# Patient Record
Sex: MALE | Race: BLACK OR AFRICAN AMERICAN | Employment: OTHER | URBAN - METROPOLITAN AREA
[De-identification: names, ages, dates, MRNs, and addresses within clinical notes are randomized per-mention and may not be internally consistent; named-entity substitution may affect disease eponyms.]

---

## 2017-01-26 ENCOUNTER — OFFICE VISIT (OUTPATIENT)
Dept: INTERNAL MEDICINE CLINIC | Age: 62
End: 2017-01-26

## 2017-01-26 VITALS
OXYGEN SATURATION: 93 % | HEIGHT: 71 IN | WEIGHT: 226.6 LBS | DIASTOLIC BLOOD PRESSURE: 85 MMHG | SYSTOLIC BLOOD PRESSURE: 130 MMHG | RESPIRATION RATE: 18 BRPM | BODY MASS INDEX: 31.72 KG/M2 | TEMPERATURE: 96.4 F | HEART RATE: 80 BPM

## 2017-01-26 DIAGNOSIS — J45.40 MODERATE PERSISTENT ASTHMA WITHOUT COMPLICATION: ICD-10-CM

## 2017-01-26 DIAGNOSIS — E29.1 MALE HYPOGONADISM: ICD-10-CM

## 2017-01-26 DIAGNOSIS — I10 ESSENTIAL HYPERTENSION: Primary | ICD-10-CM

## 2017-01-26 NOTE — PROGRESS NOTES
HISTORY OF PRESENT ILLNESS  Marina Parkinson Sr. is a 59 y.o. male. HPI  Here for htn. He is well controlled at home on his arb. He has well controlled asthma on dulera bid. He has had his immunizations last year. He sees urology for male hypogonadism and they monitor his psa. Past Medical History   Diagnosis Date    Alcohol dependence ([facility] [address]     Asthma      X 32, seasonal     Colon polyp     ED (erectile dysfunction)     GERD (gastroesophageal reflux disease)      after meals only, diet controlled no meds    Hypertension     IBS (irritable bowel syndrome)     Nephrolithiasis     Positive PPD     Right knee DJD     Seasonal allergic rhinitis     Ventral hernia 12/5/2012     Current Outpatient Prescriptions   Medication Sig    montelukast (SINGULAIR) 10 mg tablet TAKE 1 TABLET BY MOUTH DAILY    losartan-hydrochlorothiazide (HYZAAR) 50-12.5 mg per tablet TAKE 1 TABLET BY MOUTH EVERY DAY    fluticasone (FLONASE) 50 mcg/actuation nasal spray USE 2 SPRAYS IN EACH NOSTRIL DAILY    mometasone-formoterol (DULERA) 200-5 mcg/actuation HFA inhaler Take 2 Puffs by inhalation two (2) times a day.  testosterone (ANDROGEL) 20.25 mg/1.25 gram (1.62 %) gel Apply 20.25 mg to affected area daily. Max Daily Amount: 20.25 mg.    albuterol (VENTOLIN HFA) 90 mcg/actuation inhaler Take 2 puffs by inhalation every four (4) hours as needed for Wheezing.  multivitamin, stress formula (STRESS TAB) tablet Take 1 Tab by mouth daily. No current facility-administered medications for this visit. Review of Systems   All other systems reviewed and are negative. Visit Vitals    /85 (BP 1 Location: Left arm, BP Patient Position: Sitting)    Pulse 80    Temp 96.4 °F (35.8 °C) (Oral)    Resp 18    Ht 5' 11\" (1.803 m)    Wt 226 lb 9.6 oz (102.8 kg)    SpO2 93%    BMI 31.6 kg/m2       Physical Exam   Constitutional: He appears well-developed and well-nourished.    Cardiovascular: Normal rate, regular rhythm and normal heart sounds. Pulmonary/Chest: Effort normal and breath sounds normal. No respiratory distress. He has no wheezes. He has no rales. Nursing note and vitals reviewed. ASSESSMENT and Mary Tory was seen today for diabetes. Diagnoses and all orders for this visit:    Essential hypertension  -     METABOLIC PANEL, COMPREHENSIVE  -     LIPID PANEL  The current medical regimen is effective;  continue present plan and medications. Moderate persistent asthma without complication  -     CBC WITH AUTOMATED DIFF   The current medical regimen is effective;  continue present plan and medications. Male hypogonadism  The current medical regimen is effective;  continue present plan and medications.       Reviewed plan of care with the patient who has provided input and agrees with the goals

## 2017-01-26 NOTE — MR AVS SNAPSHOT
Visit Information Date & Time Provider Department Dept. Phone Encounter #  
 1/26/2017 11:30 AM Syeda Rueda MD Count includes the [facility] [phone] [id number] Upcoming Health Maintenance Date Due Hepatitis C Screening 1955 DTaP/Tdap/Td series (2 - Td) 11/11/2021 COLONOSCOPY 2/25/2024 Allergies as of 1/26/2017  Review Complete On: 1/26/2017 By: Chelle Kendall LPN Severity Noted Reaction Type Reactions Iodine  09/13/2010    Hives IVP contrast/seafood Current Immunizations  Reviewed on 10/18/2016 Name Date Influenza Vaccine 10/16/2016, 9/22/2015, 10/22/2014  2:15 PM, 10/14/2013 Influenza Vaccine Split 10/3/2012, 11/11/2011, 11/11/2010 Pneumococcal Conjugate (PCV-13) 10/26/2016 Pneumococcal Vaccine (Unspecified Type) 8/11/2010 TDAP Vaccine 11/11/2011, 8/11/2010 Zoster Vaccine, Live 11/11/2013 Not reviewed this visit You Were Diagnosed With   
  
 Codes Comments Essential hypertension    -  Primary ICD-10-CM: I10 
ICD-9-CM: 401.9 Moderate persistent asthma without complication     BIK-25-UI: J45.40 ICD-9-CM: 493.90 Male hypogonadism     ICD-10-CM: E29.1 ICD-9-CM: 257.2 Vitals BP Pulse Temp Resp Height(growth percentile) Weight(growth percentile) 130/85 (BP 1 Location: Left arm, BP Patient Position: Sitting) 80 96.4 °F (35.8 °C) (Oral) 18 5' 11\" (1.803 m) 226 lb 9.6 oz (102.8 kg) SpO2 BMI Smoking Status 93% 31.6 kg/m2 Former Smoker Vitals History BMI and BSA Data Body Mass Index Body Surface Area  
 31.6 kg/m 2 2.27 m 2 Preferred Pharmacy Pharmacy Name Phone [address] [place] [address], [facility] Juli Roberson [phone] Your Updated Medication List  
  
   
This list is accurate as of: 1/26/17 11:47 AM.  Always use your most recent med list.  
  
  
  
  
 albuterol 90 mcg/actuation inhaler Commonly known as:  VENTOLIN HFA Take 2 puffs by inhalation every four (4) hours as needed for Wheezing. DULERA 200-5 mcg/actuation HFA inhaler Generic drug:  mometasone-formoterol Take 2 Puffs by inhalation two (2) times a day. fluticasone 50 mcg/actuation nasal spray Commonly known as:  FLONASE  
USE 2 SPRAYS IN EACH NOSTRIL DAILY losartan-hydroCHLOROthiazide 50-12.5 mg per tablet Commonly known as:  HYZAAR  
TAKE 1 TABLET BY MOUTH EVERY DAY  
  
 montelukast 10 mg tablet Commonly known as:  SINGULAIR  
TAKE 1 TABLET BY MOUTH DAILY  
  
 multivitamin, stress formula tablet Commonly known as:  STRESS TAB Take 1 Tab by mouth daily. testosterone 1.62 % (20.25 mg/1.25gram) gel Commonly known as:  ANDROGEL Apply 20.25 mg to affected area daily. Max Daily Amount: 20.25 mg. We Performed the Following CBC WITH AUTOMATED DIFF [93874 CPT(R)] LIPID PANEL [78761 CPT(R)] METABOLIC PANEL, COMPREHENSIVE [14863 CPT(R)] Introducing [facility] & [facility] SERVICES! Dear [facility]: Thank you for requesting a [employer] account. Our records indicate that you already have an active [employer] account. You can access your account anytime at [email or web address] Did you know that you can access your hospital and ER discharge instructions at any time in [employer]? You can also review all of your test results from your hospital stay or ER visit. Additional Information If you have questions, please visit the Frequently Asked Questions section of the [employer] website at [email or web address] Remember, [employer] is NOT to be used for urgent needs. For medical emergencies, dial 911. Now available from your iPhone and Android! Please provide this summary of care documentation to your next provider. Your primary care clinician is listed as [phone] [address] [place] [address] [address].  If you have any questions after today's visit, please call [phone].

## 2017-01-27 LAB
ALBUMIN SERPL-MCNC: 3.9 G/DL (ref 3.6–4.8)
ALBUMIN/GLOB SERPL: 1 {RATIO} (ref 1.1–2.5)
ALP SERPL-CCNC: 65 IU/L (ref 39–117)
ALT SERPL-CCNC: 13 IU/L (ref 0–44)
AST SERPL-CCNC: 14 IU/L (ref 0–40)
BASOPHILS # BLD AUTO: 0 X10E3/UL (ref 0–0.2)
BASOPHILS NFR BLD AUTO: 0 %
BILIRUB SERPL-MCNC: 0.4 MG/DL (ref 0–1.2)
BUN SERPL-MCNC: 13 MG/DL (ref 8–27)
BUN/CREAT SERPL: 14 (ref 10–22)
CALCIUM SERPL-MCNC: 9.5 MG/DL (ref 8.6–10.2)
CHLORIDE SERPL-SCNC: 99 MMOL/L (ref 96–106)
CHOLEST SERPL-MCNC: 171 MG/DL (ref 100–199)
CO2 SERPL-SCNC: 25 MMOL/L (ref 18–29)
CREAT SERPL-MCNC: 0.92 MG/DL (ref 0.76–1.27)
EOSINOPHIL # BLD AUTO: 0.1 X10E3/UL (ref 0–0.4)
EOSINOPHIL NFR BLD AUTO: 1 %
ERYTHROCYTE [DISTWIDTH] IN BLOOD BY AUTOMATED COUNT: 14.8 % (ref 12.3–15.4)
GLOBULIN SER CALC-MCNC: 4 G/DL (ref 1.5–4.5)
GLUCOSE SERPL-MCNC: 85 MG/DL (ref 65–99)
HCT VFR BLD AUTO: 45.2 % (ref 37.5–51)
HDLC SERPL-MCNC: 43 MG/DL
HGB BLD-MCNC: 14.6 G/DL (ref 12.6–17.7)
IMM GRANULOCYTES # BLD: 0 X10E3/UL (ref 0–0.1)
IMM GRANULOCYTES NFR BLD: 0 %
INTERPRETATION, 910389: NORMAL
LDLC SERPL CALC-MCNC: 120 MG/DL (ref 0–99)
LYMPHOCYTES # BLD AUTO: 2 X10E3/UL (ref 0.7–3.1)
LYMPHOCYTES NFR BLD AUTO: 35 %
MCH RBC QN AUTO: 28.7 PG (ref 26.6–33)
MCHC RBC AUTO-ENTMCNC: 32.3 G/DL (ref 31.5–35.7)
MCV RBC AUTO: 89 FL (ref 79–97)
MONOCYTES # BLD AUTO: 0.5 X10E3/UL (ref 0.1–0.9)
MONOCYTES NFR BLD AUTO: 9 %
NEUTROPHILS # BLD AUTO: 3.2 X10E3/UL (ref 1.4–7)
NEUTROPHILS NFR BLD AUTO: 55 %
PLATELET # BLD AUTO: 287 X10E3/UL (ref 150–379)
POTASSIUM SERPL-SCNC: 4.1 MMOL/L (ref 3.5–5.2)
PROT SERPL-MCNC: 7.9 G/DL (ref 6–8.5)
RBC # BLD AUTO: 5.09 X10E6/UL (ref 4.14–5.8)
SODIUM SERPL-SCNC: 140 MMOL/L (ref 134–144)
TRIGL SERPL-MCNC: 42 MG/DL (ref 0–149)
VLDLC SERPL CALC-MCNC: 8 MG/DL (ref 5–40)
WBC # BLD AUTO: 5.8 X10E3/UL (ref 3.4–10.8)

## 2017-03-13 RX ORDER — LOSARTAN POTASSIUM AND HYDROCHLOROTHIAZIDE 12.5; 5 MG/1; MG/1
TABLET ORAL
Qty: 90 TAB | Refills: 1 | Status: SHIPPED | OUTPATIENT
Start: 2017-03-13 | End: 2017-11-22 | Stop reason: SDUPTHER

## 2017-05-18 ENCOUNTER — OFFICE VISIT (OUTPATIENT)
Dept: INTERNAL MEDICINE CLINIC | Age: 62
End: 2017-05-18

## 2017-05-18 VITALS
WEIGHT: 222.6 LBS | TEMPERATURE: 97.7 F | HEART RATE: 72 BPM | DIASTOLIC BLOOD PRESSURE: 85 MMHG | SYSTOLIC BLOOD PRESSURE: 140 MMHG | OXYGEN SATURATION: 96 % | RESPIRATION RATE: 18 BRPM | BODY MASS INDEX: 31.16 KG/M2 | HEIGHT: 71 IN

## 2017-05-18 DIAGNOSIS — Z00.00 WELL ADULT EXAM: Primary | ICD-10-CM

## 2017-05-18 DIAGNOSIS — J45.40 MODERATE PERSISTENT ASTHMA WITHOUT COMPLICATION: ICD-10-CM

## 2017-05-18 DIAGNOSIS — E29.1 MALE HYPOGONADISM: ICD-10-CM

## 2017-05-18 DIAGNOSIS — D12.2 ADENOMATOUS POLYP OF ASCENDING COLON: ICD-10-CM

## 2017-05-18 DIAGNOSIS — I10 ESSENTIAL HYPERTENSION: ICD-10-CM

## 2017-05-18 DIAGNOSIS — N52.9 ERECTILE DYSFUNCTION, UNSPECIFIED ERECTILE DYSFUNCTION TYPE: ICD-10-CM

## 2017-05-18 NOTE — PROGRESS NOTES
1. Have you been to the ER, urgent care clinic since your last visit? Hospitalized since your last visit? No    2. Have you seen or consulted any other health care providers outside of the [address] [place] [address] since your last visit? Include any pap smears or colon screening.  No   Chief Complaint   Patient presents with    Annual Wellness Visit     fasting    Waist is 43.5

## 2017-05-18 NOTE — MR AVS SNAPSHOT
Visit Information Date & Time Provider Department Dept. Phone Encounter #  
 5/18/2017  8:30 AM Jer Brown MD [facility] [phone] [id number] Upcoming Health Maintenance Date Due Hepatitis C Screening 1955 INFLUENZA AGE 9 TO ADULT 8/1/2017 DTaP/Tdap/Td series (2 - Td) 11/11/2021 COLONOSCOPY 2/25/2024 Allergies as of 5/18/2017  Review Complete On: 5/18/2017 By: Jez Pod Severity Noted Reaction Type Reactions Iodine  09/13/2010    Hives IVP contrast/seafood Current Immunizations  Reviewed on 10/18/2016 Name Date Influenza Vaccine 10/16/2016, 9/22/2015, 10/22/2014  2:15 PM, 10/14/2013 Influenza Vaccine Split 10/3/2012, 11/11/2011, 11/11/2010 Pneumococcal Conjugate (PCV-13) 10/26/2016 Pneumococcal Vaccine (Unspecified Type) 8/11/2010 TDAP Vaccine 11/11/2011, 8/11/2010 Zoster Vaccine, Live 11/11/2013 Not reviewed this visit You Were Diagnosed With   
  
 Codes Comments Well adult exam    -  Primary ICD-10-CM: Z00.00 ICD-9-CM: V70.0 Essential hypertension     ICD-10-CM: I10 
ICD-9-CM: 401.9 Moderate persistent asthma without complication     KYG-54-QI: J45.40 ICD-9-CM: 493.90 Male hypogonadism     ICD-10-CM: E29.1 ICD-9-CM: 257.2 Erectile dysfunction, unspecified erectile dysfunction type     ICD-10-CM: N52.9 ICD-9-CM: 607.84 Adenomatous polyp of ascending colon     ICD-10-CM: D12.2 ICD-9-CM: 211.3 Vitals BP Pulse Temp Resp Height(growth percentile) Weight(growth percentile) 140/85 (BP 1 Location: Left arm, BP Patient Position: At rest) 72 97.7 °F (36.5 °C) (Oral) 18 5' 11\" (1.803 m) 222 lb 9.6 oz (101 kg) SpO2 BMI Smoking Status 96% 31.05 kg/m2 Former Smoker Vitals History BMI and BSA Data Body Mass Index Body Surface Area 31.05 kg/m 2 2.25 m 2 Preferred Pharmacy Pharmacy Name Phone [address] [place] [address] Mary Roberson [phone] Your Updated Medication List  
  
   
This list is accurate as of: 5/18/17  8:47 AM.  Always use your most recent med list.  
  
  
  
  
 albuterol 90 mcg/actuation inhaler Commonly known as:  VENTOLIN HFA Take 2 puffs by inhalation every four (4) hours as needed for Wheezing. DULERA 200-5 mcg/actuation HFA inhaler Generic drug:  mometasone-formoterol Take 2 Puffs by inhalation two (2) times a day. fluticasone 50 mcg/actuation nasal spray Commonly known as:  FLONASE  
USE 2 SPRAYS IN EACH NOSTRIL DAILY losartan-hydroCHLOROthiazide 50-12.5 mg per tablet Commonly known as:  HYZAAR  
TAKE 1 TABLET BY MOUTH EVERY DAY  
  
 montelukast 10 mg tablet Commonly known as:  SINGULAIR  
TAKE 1 TABLET BY MOUTH DAILY  
  
 multivitamin, stress formula tablet Commonly known as:  STRESS TAB Take 1 Tab by mouth daily. testosterone 20.25 mg/1.25 gram (1.62 %) gel Commonly known as:  ANDROGEL Apply 20.25 mg to affected area daily. Max Daily Amount: 20.25 mg. Introducing [facility] & HEALTH SERVICES! Dear Arnaud Zimmerman: Thank you for requesting a [employer] account. Our records indicate that you already have an active [employer] account. You can access your account anytime at [email or web address] Did you know that you can access your hospital and ER discharge instructions at any time in [employer]? You can also review all of your test results from your hospital stay or ER visit. Additional Information If you have questions, please visit the Frequently Asked Questions section of the [employer] website at [email or web address] Remember, [employer] is NOT to be used for urgent needs. For medical emergencies, dial 911. Now available from your iPhone and Android! Please provide this summary of care documentation to your next provider. Your primary care clinician is listed as [phone] [address] [place] [address] [address]. If you have any questions after today's visit, please call [phone].

## 2017-05-18 NOTE — PROGRESS NOTES
HISTORY OF PRESENT ILLNESS  Jocelyn Bedoya Sr. is a 59 y.o. male. HPI  Here for a pe. He is in good health. His htn is controlled on an arb. His asthma is well controlled. He sees pulmonary. He has ed and male hypogonadism doing well on trt and his implant is functioning well. He golfs and is cycling for exercise now. His vaccines are current. He has colon polyps up to date with screening. Past Medical History:   Diagnosis Date    Alcohol dependence ([address]     Asthma     X 32, seasonal     Colon polyp     ED (erectile dysfunction)     GERD (gastroesophageal reflux disease)     after meals only, diet controlled no meds    Hypertension     IBS (irritable bowel syndrome)     Nephrolithiasis     Positive PPD     Right knee DJD     Seasonal allergic rhinitis     Ventral hernia 12/5/2012     Current Outpatient Prescriptions   Medication Sig    losartan-hydroCHLOROthiazide (HYZAAR) 50-12.5 mg per tablet TAKE 1 TABLET BY MOUTH EVERY DAY    montelukast (SINGULAIR) 10 mg tablet TAKE 1 TABLET BY MOUTH DAILY    fluticasone (FLONASE) 50 mcg/actuation nasal spray USE 2 SPRAYS IN EACH NOSTRIL DAILY    mometasone-formoterol (DULERA) 200-5 mcg/actuation HFA inhaler Take 2 Puffs by inhalation two (2) times a day.  testosterone (ANDROGEL) 20.25 mg/1.25 gram (1.62 %) gel Apply 20.25 mg to affected area daily. Max Daily Amount: 20.25 mg.    albuterol (VENTOLIN HFA) 90 mcg/actuation inhaler Take 2 puffs by inhalation every four (4) hours as needed for Wheezing.  multivitamin, stress formula (STRESS TAB) tablet Take 1 Tab by mouth daily. No current facility-administered medications for this visit. Review of Systems   All other systems reviewed and are negative.     Visit Vitals    /85 (BP 1 Location: Left arm, BP Patient Position: At rest)    Pulse 72    Temp 97.7 °F (36.5 °C) (Oral)    Resp 18    Ht 5' 11\" (1.803 m)    Wt 222 lb 9.6 oz (101 kg)    SpO2 96%    BMI 31.05 kg/m2       Physical Exam   Constitutional: He appears well-developed and well-nourished. Cardiovascular: Normal rate, regular rhythm and normal heart sounds. Pulmonary/Chest: Effort normal and breath sounds normal. No respiratory distress. He has no wheezes. He has no rales. Psychiatric: He has a normal mood and affect. His behavior is normal. Judgment and thought content normal.   Nursing note and vitals reviewed. ASSESSMENT and PLAN  Michelle Jacobson was seen today for annual wellness visit. Diagnoses and all orders for this visit:    Well adult exam    Essential hypertension   The current medical regimen is effective;  continue present plan and medications. Moderate persistent asthma without complication  The current medical regimen is effective;  continue present plan and medications. Male hypogonadism  The current medical regimen is effective;  continue present plan and medications. Erectile dysfunction, unspecified erectile dysfunction type  The current medical regimen is effective;  continue present plan and medications. Adenomatous polyp of ascending colon  Up to date.     Reviewed plan of care with the patient who has provided input and agrees with the goals

## 2017-08-02 RX ORDER — MONTELUKAST SODIUM 10 MG/1
TABLET ORAL
Qty: 90 TAB | Refills: 1 | Status: SHIPPED | OUTPATIENT
Start: 2017-08-02 | End: 2018-05-18 | Stop reason: SDUPTHER

## 2017-08-02 RX ORDER — FLUTICASONE PROPIONATE 50 MCG
SPRAY, SUSPENSION (ML) NASAL
Qty: 3 BOTTLE | Refills: 1 | Status: SHIPPED | OUTPATIENT
Start: 2017-08-02 | End: 2018-02-19 | Stop reason: SDUPTHER

## 2017-11-27 RX ORDER — LOSARTAN POTASSIUM AND HYDROCHLOROTHIAZIDE 12.5; 5 MG/1; MG/1
TABLET ORAL
Qty: 90 TAB | Refills: 1 | Status: SHIPPED | OUTPATIENT
Start: 2017-11-27 | End: 2018-05-18 | Stop reason: SDUPTHER

## 2017-12-15 ENCOUNTER — OFFICE VISIT (OUTPATIENT)
Dept: INTERNAL MEDICINE CLINIC | Age: 62
End: 2017-12-15

## 2017-12-15 VITALS
HEART RATE: 78 BPM | RESPIRATION RATE: 16 BRPM | HEIGHT: 71 IN | OXYGEN SATURATION: 97 % | SYSTOLIC BLOOD PRESSURE: 120 MMHG | TEMPERATURE: 97.8 F | BODY MASS INDEX: 32.42 KG/M2 | WEIGHT: 231.6 LBS | DIASTOLIC BLOOD PRESSURE: 82 MMHG

## 2017-12-15 DIAGNOSIS — J45.40 MODERATE PERSISTENT ASTHMA WITHOUT COMPLICATION: Primary | ICD-10-CM

## 2017-12-15 DIAGNOSIS — F43.9 SITUATIONAL STRESS: ICD-10-CM

## 2017-12-15 DIAGNOSIS — K21.9 GASTROESOPHAGEAL REFLUX DISEASE WITHOUT ESOPHAGITIS: ICD-10-CM

## 2017-12-15 DIAGNOSIS — E29.1 MALE HYPOGONADISM: ICD-10-CM

## 2017-12-15 DIAGNOSIS — I10 ESSENTIAL HYPERTENSION: ICD-10-CM

## 2017-12-15 RX ORDER — DICLOFENAC SODIUM 50 MG/1
50 TABLET, DELAYED RELEASE ORAL
COMMUNITY
Start: 2017-07-05 | End: 2022-05-19 | Stop reason: ALTCHOICE

## 2017-12-15 NOTE — PROGRESS NOTES
1. Have you been to the ER, urgent care clinic since your last visit? Yes [facility]  Hospitalized since your last visit?no      2. Have you seen or consulted any other health care providers outside of the [address] [place] [address] since your last visit? Include any pap smears or colon screening.  No    Chief Complaint   Patient presents with    Hypertension     follow up     Not fasting

## 2017-12-15 NOTE — MR AVS SNAPSHOT
Visit Information Date & Time Provider Department Dept. Phone Encounter #  
 12/15/2017 11:15 AM Holly Felder MD [facility] [phone] [id number] Upcoming Health Maintenance Date Due Hepatitis C Screening 1955 DTaP/Tdap/Td series (2 - Td) 11/11/2021 COLONOSCOPY 2/25/2024 Allergies as of 12/15/2017  Review Complete On: 5/18/2017 By: Igor Huang Severity Noted Reaction Type Reactions Iodine  09/13/2010    Hives IVP contrast/seafood Current Immunizations  Reviewed on 10/18/2016 Name Date Influenza Vaccine 10/18/2017, 10/16/2016, 9/22/2015, 10/22/2014  2:15 PM, 10/14/2013 Influenza Vaccine Split 10/3/2012, 11/11/2011, 11/11/2010 Pneumococcal Conjugate (PCV-13) 10/26/2016 TDAP Vaccine 11/11/2011, 8/11/2010 ZZZ-RETIRED (DO NOT USE) Pneumococcal Vaccine (Unspecified Type) 8/11/2010 Zoster Vaccine, Live 11/11/2013 Not reviewed this visit You Were Diagnosed With   
  
 Codes Comments Moderate persistent asthma without complication    -  Primary ICD-10-CM: J45.40 ICD-9-CM: 493.90 Essential hypertension     ICD-10-CM: I10 
ICD-9-CM: 401.9 Male hypogonadism     ICD-10-CM: E29.1 ICD-9-CM: 257.2 Vitals BP Pulse Temp Resp Height(growth percentile) Weight(growth percentile) (!) 144/94 (BP 1 Location: Left arm, BP Patient Position: At rest) 78 97.8 °F (36.6 °C) (Oral) 16 5' 11\" (1.803 m) 231 lb 9.6 oz (105.1 kg) SpO2 BMI Smoking Status 97% 32.3 kg/m2 Former Smoker BMI and BSA Data Body Mass Index Body Surface Area  
 32.3 kg/m 2 2.29 m 2 Preferred Pharmacy Pharmacy Name Phone [address] [place] [address], [facility] Mary Roberson [phone] Your Updated Medication List  
  
   
This list is accurate as of: 12/15/17 11:27 AM.  Always use your most recent med list.  
  
  
  
  
 albuterol 90 mcg/actuation inhaler Commonly known as:  VENTOLIN HFA Take 2 puffs by inhalation every four (4) hours as needed for Wheezing. diclofenac EC 50 mg EC tablet Commonly known as:  VOLTAREN Take 50 mg by mouth. DULERA 200-5 mcg/actuation HFA inhaler Generic drug:  mometasone-formoterol Take 2 Puffs by inhalation two (2) times a day. fluticasone 50 mcg/actuation nasal spray Commonly known as:  FLONASE  
USE 2 SPRAYS IN EACH NOSTRIL DAILY losartan-hydroCHLOROthiazide 50-12.5 mg per tablet Commonly known as:  HYZAAR  
TAKE 1 TABLET BY MOUTH EVERY DAY  
  
 montelukast 10 mg tablet Commonly known as:  SINGULAIR  
TAKE 1 TABLET BY MOUTH DAILY  
  
 multivitamin, stress formula tablet Commonly known as:  STRESS TAB Take 1 Tab by mouth daily. testosterone 20.25 mg/1.25 gram (1.62 %) gel Commonly known as:  ANDROGEL Apply 20.25 mg to affected area daily. Max Daily Amount: 20.25 mg. Introducing [facility] & HEALTH SERVICES! Dear Jhonatan Blanton: Thank you for requesting a [employer] account. Our records indicate that you already have an active [employer] account. You can access your account anytime at [email or web address] Did you know that you can access your hospital and ER discharge instructions at any time in [employer]? You can also review all of your test results from your hospital stay or ER visit. Additional Information If you have questions, please visit the Frequently Asked Questions section of the [employer] website at [email or web address] Remember, [employer] is NOT to be used for urgent needs. For medical emergencies, dial 911. Now available from your iPhone and Android! Please provide this summary of care documentation to your next provider. Your primary care clinician is listed as [phone] [address] [place] [address] [address]. If you have any questions after today's visit, please call [phone].

## 2018-02-20 RX ORDER — FLUTICASONE PROPIONATE 50 MCG
SPRAY, SUSPENSION (ML) NASAL
Qty: 3 BOTTLE | Refills: 1 | Status: SHIPPED | OUTPATIENT
Start: 2018-02-20 | End: 2018-05-18 | Stop reason: SDUPTHER

## 2018-02-20 NOTE — TELEPHONE ENCOUNTER
Last seen: 12/15/2017    Requested Prescriptions     Pending Prescriptions Disp Refills    fluticasone (FLONASE) 50 mcg/actuation nasal spray 3 Bottle 1

## 2018-05-18 ENCOUNTER — OFFICE VISIT (OUTPATIENT)
Dept: INTERNAL MEDICINE CLINIC | Age: 63
End: 2018-05-18

## 2018-05-18 VITALS
HEART RATE: 65 BPM | TEMPERATURE: 98.1 F | OXYGEN SATURATION: 95 % | WEIGHT: 231 LBS | HEIGHT: 70 IN | SYSTOLIC BLOOD PRESSURE: 164 MMHG | BODY MASS INDEX: 33.07 KG/M2 | DIASTOLIC BLOOD PRESSURE: 77 MMHG

## 2018-05-18 DIAGNOSIS — I10 ESSENTIAL HYPERTENSION: Primary | ICD-10-CM

## 2018-05-18 DIAGNOSIS — Z11.59 ENCOUNTER FOR HEPATITIS C SCREENING TEST FOR LOW RISK PATIENT: ICD-10-CM

## 2018-05-18 DIAGNOSIS — J45.20 MILD INTERMITTENT ASTHMA, UNSPECIFIED WHETHER COMPLICATED: ICD-10-CM

## 2018-05-18 DIAGNOSIS — E66.9 OBESITY WITH SERIOUS COMORBIDITY, UNSPECIFIED CLASSIFICATION, UNSPECIFIED OBESITY TYPE: ICD-10-CM

## 2018-05-18 RX ORDER — CLOMIPHENE CITRATE 50 MG/1
50 TABLET ORAL DAILY
COMMUNITY
End: 2022-05-19

## 2018-05-18 RX ORDER — MONTELUKAST SODIUM 10 MG/1
TABLET ORAL
Qty: 90 TAB | Refills: 3 | Status: SHIPPED | OUTPATIENT
Start: 2018-05-18 | End: 2019-07-05 | Stop reason: SDUPTHER

## 2018-05-18 RX ORDER — LOSARTAN POTASSIUM AND HYDROCHLOROTHIAZIDE 12.5; 5 MG/1; MG/1
TABLET ORAL
Qty: 90 TAB | Refills: 3 | Status: SHIPPED | OUTPATIENT
Start: 2018-05-18 | End: 2019-04-24

## 2018-05-18 RX ORDER — FLUTICASONE PROPIONATE 50 MCG
SPRAY, SUSPENSION (ML) NASAL
Qty: 3 BOTTLE | Refills: 3 | Status: SHIPPED | OUTPATIENT
Start: 2018-05-18 | End: 2019-07-05 | Stop reason: SDUPTHER

## 2018-05-18 RX ORDER — FOLIC ACID 1 MG/1
TABLET ORAL DAILY
COMMUNITY
End: 2022-05-18 | Stop reason: ALTCHOICE

## 2018-05-18 NOTE — MR AVS SNAPSHOT
[address] [place] [phone] Patient: Marcy Daniel Sr. MRN:  :[id number] Visit Information Date & Time Provider Department Dept. Phone Encounter #  
 2018 11:00 AM Rosie Gross, [address] [place] [address],[address] [phone] [id number] Follow-up Instructions Return in about 3 months (around 2018) for htn. Upcoming Health Maintenance Date Due Hepatitis C Screening 1955 Influenza Age 5 to Adult 2018 DTaP/Tdap/Td series (2 - Td) 2021 COLONOSCOPY 2024 Allergies as of 2018  Review Complete On: 2018 By: [place] LPN Severity Noted Reaction Type Reactions Iodine  2010    Hives IVP contrast/seafood Current Immunizations  Reviewed on 10/18/2016 Name Date Influenza Vaccine 10/18/2017, 10/16/2016, 2015, 10/22/2014  2:15 PM, 10/14/2013 Influenza Vaccine Split 10/3/2012, 2011, 2010 Pneumococcal Conjugate (PCV-13) 10/26/2016 TDAP Vaccine 2011, 2010 ZZZ-RETIRED (DO NOT USE) Pneumococcal Vaccine (Unspecified Type) 2010 Zoster Vaccine, Live 2013 Not reviewed this visit You Were Diagnosed With   
  
 Codes Comments Essential hypertension    -  Primary ICD-10-CM: I10 
ICD-9-CM: 401.9 Mild intermittent asthma, unspecified whether complicated     YDY-15-BP: J45.20 ICD-9-CM: 493.90 Obesity with serious comorbidity, unspecified classification, unspecified obesity type     ICD-10-CM: E66.9 ICD-9-CM: 278.00 Encounter for hepatitis C screening test for low risk patient     ICD-10-CM: Z11.59 
ICD-9-CM: V73.89 Vitals BP Pulse Temp Height(growth percentile) Weight(growth percentile) SpO2  
 164/77 (BP 1 Location: Left arm, BP Patient Position: Sitting) 65 98.1 °F (36.7 °C) (Oral) 5' 10\" (1.778 m) 231 lb (104.8 kg) 95% BMI Smoking Status 33.15 kg/m2 Former Smoker BMI and BSA Data Body Mass Index Body Surface Area  
 33.15 kg/m 2 2.28 m 2 Preferred Pharmacy Pharmacy Name Phone [address] Mary Roberson [phone] Your Updated Medication List  
  
   
This list is accurate as of 5/18/18 11:52 AM.  Always use your most recent med list.  
  
  
  
  
 albuterol 90 mcg/actuation inhaler Commonly known as:  VENTOLIN HFA Take 2 puffs by inhalation every four (4) hours as needed for Wheezing. clomiPHENE 50 mg tablet Commonly known as:  CLOMID Take 50 mg by mouth daily. diclofenac EC 50 mg EC tablet Commonly known as:  VOLTAREN Take 50 mg by mouth. DULERA 200-5 mcg/actuation HFA inhaler Generic drug:  mometasone-formoterol Take 2 Puffs by inhalation two (2) times a day. fluticasone 50 mcg/actuation nasal spray Commonly known as:  FLONASE  
USE 2 SPRAYS IN EACH NOSTRIL DAILY  
  
 folic acid 1 mg tablet Commonly known as:  Google Take  by mouth daily. losartan-hydroCHLOROthiazide 50-12.5 mg per tablet Commonly known as:  HYZAAR  
TAKE 1 TABLET BY MOUTH EVERY DAY  
  
 montelukast 10 mg tablet Commonly known as:  SINGULAIR  
TAKE 1 TABLET BY MOUTH DAILY  
  
 multivitamin, stress formula tablet Commonly known as:  STRESS TAB Take 1 Tab by mouth daily. PROBIOTIC 4X 10-15 mg Tbec Generic drug:  B.infantis-B.ani-B.long-B.bifi Take  by mouth. testosterone 20.25 mg/1.25 gram (1.62 %) gel Commonly known as:  ANDROGEL Apply 20.25 mg to affected area daily. Max Daily Amount: 20.25 mg.  
  
 TURMERIC (BULK)  
by Does Not Apply route. Prescriptions Sent to Pharmacy Refills  
 fluticasone (FLONASE) 50 mcg/actuation nasal spray 3 Sig: USE 2 SPRAYS IN EACH NOSTRIL DAILY  Class: Normal  
 Pharmacy: [address] [place] [address] Dominique, [address]  #: [phone]  
 losartan-hydroCHLOROthiazide [facility]) 50-12.5 mg per tablet 3 Sig: TAKE 1 TABLET BY MOUTH EVERY DAY Class: Normal  
 Pharmacy: [address] [place], [place] [address] Ph #: [phone]  
 montelukast (SINGULAIR) 10 mg tablet 3 Sig: TAKE 1 TABLET BY MOUTH DAILY Class: Normal  
 Pharmacy: [address] [place] [address] Radha Foxraat  Ph #: [phone] We Performed the Following CBC W/O DIFF [15668 CPT(R)] HEPATITIS C AB [02533 CPT(R)] LIPID PANEL [65819 CPT(R)] METABOLIC PANEL, COMPREHENSIVE [75962 CPT(R)] Follow-up Instructions Return in about 3 months (around 8/18/2018) for htn. Introducing [facility] & [facility] SERVICES! Dear Dexter Chang: Thank you for requesting a [employer] account. Our records indicate that you already have an active [employer] account. You can access your account anytime at [email or web address] Did you know that you can access your hospital and ER discharge instructions at any time in [employer] You can also review all of your test results from your hospital stay or ER visit. Additional Information If you have questions, please visit the Frequently Asked Questions section of the [employer] website at [email or web address] Remember, [employer] is NOT to be used for urgent needs. For medical emergencies, dial 911. Now available from your iPhone and Android! Please provide this summary of care documentation to your next provider. Your primary care clinician is listed as John SHANE. If you have any questions after today's visit, please call [phone].

## 2018-05-18 NOTE — PROGRESS NOTES
HISTORY OF PRESENT ILLNESS  Kike Rodriguez is a 57 y.o. male. HPI   New pt to establish care ( former PCP Dr Sarika Limon)  Hx HTN , Asthma, knee OA  Sees Dr Eden Arndt for ED, ( penile implant) and hypogonadism-clomiphene  Sees Dr Camden Rojas for Asthma   1 son  [employer]    Had prostate bx 2012-benign path  Last OV ( Dr Sarika Limon)  Here for HTN. He is well controlled on an ARB. He is doing well with asthma on his inhaler. He is seeing urology for PSA every six months . He had GERD last night after eating before bedtime . He is worried about his wife who may have sarcoid. Patient Active Problem List    Diagnosis Date Noted    Situational stress 12/15/2017    Essential hypertension 01/26/2017    Moderate persistent asthma without complication 54/95/1073    Sepsis ([facility] [address] 04/29/2016    Pneumonia 04/29/2016    Right knee DJD     Nephrolithiasis     Colon polyp     Ventral hernia 12/05/2012    Seasonal allergic rhinitis 10/03/2012    Male hypogonadism 11/11/2011    ED (erectile dysfunction)      Current Outpatient Prescriptions   Medication Sig Dispense Refill    TURMERIC, BULK, by Does Not Apply route.  B.infantis-B.ani-B.long-B.bifi (PROBIOTIC 4X) 10-15 mg TbEC Take  by mouth.  clomiPHENE (CLOMID) 50 mg tablet Take 50 mg by mouth daily.  folic acid (FOLVITE) 1 mg tablet Take  by mouth daily.  fluticasone (FLONASE) 50 mcg/actuation nasal spray USE 2 SPRAYS IN EACH NOSTRIL DAILY 3 Bottle 1    diclofenac EC (VOLTAREN) 50 mg EC tablet Take 50 mg by mouth.  losartan-hydroCHLOROthiazide (HYZAAR) 50-12.5 mg per tablet TAKE 1 TABLET BY MOUTH EVERY DAY 90 Tab 1    montelukast (SINGULAIR) 10 mg tablet TAKE 1 TABLET BY MOUTH DAILY 90 Tab 1    mometasone-formoterol (DULERA) 200-5 mcg/actuation HFA inhaler Take 2 Puffs by inhalation two (2) times a day.  multivitamin, stress formula (STRESS TAB) tablet Take 1 Tab by mouth daily.       testosterone (ANDROGEL) 20.25 mg/1.25 gram (1.62 %) gel Apply 20.25 mg to affected area daily. Max Daily Amount: 20.25 mg. 3 Bottle 1    albuterol (VENTOLIN HFA) 90 mcg/actuation inhaler Take 2 puffs by inhalation every four (4) hours as needed for Wheezing.  1 Inhaler 5     Allergies   Allergen Reactions    Iodine Hives     IVP contrast/seafood     Past Medical History:   Diagnosis Date    Alcohol dependence ([facility] [address]     Asthma     X 32, seasonal     Colon polyp     ED (erectile dysfunction)     GERD (gastroesophageal reflux disease)     after meals only, diet controlled no meds    Hypertension     IBS (irritable bowel syndrome)     Nephrolithiasis     Positive PPD     Right knee DJD     Seasonal allergic rhinitis     Ventral hernia 12/5/2012     Past Surgical History:   Procedure Laterality Date    HX COLONOSCOPY  2014    due 2019    HX PROSTATECTOMY  12/12    Biopsy    HX VASECTOMY      PROSTHESIS, PENILE, INFLATAB       Family History   Problem Relation Age of Onset    Alcohol abuse Father     Diabetes Father     Cancer Father      larynx    Hypertension Mother     Alcohol abuse Paternal Grandmother     Diabetes Paternal Grandmother      Social History   Substance Use Topics    Smoking status: Former Smoker     Packs/day: 1.00     Years: 12.00     Quit date: 9/1/1981    Smokeless tobacco: Never Used    Alcohol use Yes      Comment: 6 glasses per month      Lab Results  Component Value Date/Time   Hemoglobin A1c 5.8 (H) 11/11/2011 08:54 AM   Glucose 85 01/26/2017 02:14 PM   Microalb/Creat ratio (ug/mg creat.) 11.4 08/26/2014 09:25 AM   LDL, calculated 120 (H) 01/26/2017 02:14 PM   Creatinine 0.92 01/26/2017 02:14 PM      Lab Results  Component Value Date/Time   Cholesterol, total 171 01/26/2017 02:14 PM   HDL Cholesterol 43 01/26/2017 02:14 PM   LDL, calculated 120 (H) 01/26/2017 02:14 PM   Triglyceride 42 01/26/2017 02:14 PM   CHOL/HDL Ratio 4.2 08/14/2010 06:46 AM     Lab Results  Component Value Date/Time   GFR est non-AA 88 01/26/2017 02:14 PM   GFR est  01/26/2017 02:14 PM   Creatinine 0.92 01/26/2017 02:14 PM   BUN 13 01/26/2017 02:14 PM   Sodium 140 01/26/2017 02:14 PM   Potassium 4.1 01/26/2017 02:14 PM   Chloride 99 01/26/2017 02:14 PM   CO2 25 01/26/2017 02:14 PM        ROS    Physical Exam   Constitutional: He appears well-developed and well-nourished. No distress. Appears stated age, obese   HENT:   Head: Normocephalic. Cardiovascular: Normal rate, regular rhythm and normal heart sounds. Exam reveals no gallop and no friction rub. No murmur heard. Pulmonary/Chest: Effort normal. He has no wheezes. He has no rales. He exhibits no tenderness. Abdominal: Soft. He exhibits no distension and no mass. There is no tenderness. There is no rebound and no guarding. Rectus diastasis   Musculoskeletal: He exhibits no edema. Warm right knee , small effusion   Neurological: He is alert. Psychiatric: He has a normal mood and affect. Nursing note and vitals reviewed. ASSESSMENT and PLAN  Diagnoses and all orders for this visit:    1. Essential hypertension  -     METABOLIC PANEL, COMPREHENSIVE  -     LIPID PANEL  -     CBC W/O DIFF   Mild -mod SBP elevation, advised low sodium diet, bp monitoring with home cuff  2. Mild intermittent asthma, unspecified whether complicated   Well controlled on dulera  3. Obesity with serious comorbidity, unspecified classification, unspecified obesity type   I have reviewed/discussed the above normal BMI with the patient. I have recommended the following interventions: dietary management education, guidance, and counseling . Alyssa Ramos     4. Encounter for hepatitis C screening test for low risk patient  -     HEPATITIS C AB    Other orders  -     fluticasone (FLONASE) 50 mcg/actuation nasal spray; USE 2 SPRAYS IN EACH NOSTRIL DAILY  -     losartan-hydroCHLOROthiazide (HYZAAR) 50-12.5 mg per tablet; TAKE 1 TABLET BY MOUTH EVERY DAY  -     montelukast (SINGULAIR) 10 mg tablet; TAKE 1 TABLET BY MOUTH DAILY      Follow-up Disposition:  Return in about 3 months (around 8/18/2018) for htn.

## 2018-07-31 ENCOUNTER — TELEPHONE (OUTPATIENT)
Dept: INTERNAL MEDICINE CLINIC | Age: 63
End: 2018-07-31

## 2018-07-31 NOTE — TELEPHONE ENCOUNTER
Pt is requesting to have a new order for labs written, because he misplaced the previous one given to him.  Best contact number: [phone]       Message received & copied from [facility]

## 2018-07-31 NOTE — TELEPHONE ENCOUNTER
Spoke with patient after 2 patient identifiers being note and advised that he could come In to the office and get his labs drawn and not get replacement slips, he reported he would be here at 8am sharp in the morning. Patient expressed understanding and has no further questions at this time.

## 2018-08-01 ENCOUNTER — APPOINTMENT (OUTPATIENT)
Dept: INTERNAL MEDICINE CLINIC | Age: 63
End: 2018-08-01

## 2018-08-02 LAB
ALBUMIN SERPL-MCNC: 4.2 G/DL (ref 3.6–4.8)
ALBUMIN/GLOB SERPL: 1.2 {RATIO} (ref 1.2–2.2)
ALP SERPL-CCNC: 64 IU/L (ref 39–117)
ALT SERPL-CCNC: 23 IU/L (ref 0–44)
AST SERPL-CCNC: 20 IU/L (ref 0–40)
BILIRUB SERPL-MCNC: 0.3 MG/DL (ref 0–1.2)
BUN SERPL-MCNC: 15 MG/DL (ref 8–27)
BUN/CREAT SERPL: 16 (ref 10–24)
CALCIUM SERPL-MCNC: 9.2 MG/DL (ref 8.6–10.2)
CHLORIDE SERPL-SCNC: 102 MMOL/L (ref 96–106)
CHOLEST SERPL-MCNC: 211 MG/DL (ref 100–199)
CO2 SERPL-SCNC: 26 MMOL/L (ref 20–29)
CREAT SERPL-MCNC: 0.94 MG/DL (ref 0.76–1.27)
ERYTHROCYTE [DISTWIDTH] IN BLOOD BY AUTOMATED COUNT: 15.2 % (ref 12.3–15.4)
GLOBULIN SER CALC-MCNC: 3.5 G/DL (ref 1.5–4.5)
GLUCOSE SERPL-MCNC: 94 MG/DL (ref 65–99)
HCT VFR BLD AUTO: 42.2 % (ref 37.5–51)
HDLC SERPL-MCNC: 43 MG/DL
HGB BLD-MCNC: 14.3 G/DL (ref 13–17.7)
LDLC SERPL CALC-MCNC: 158 MG/DL (ref 0–99)
MCH RBC QN AUTO: 30.2 PG (ref 26.6–33)
MCHC RBC AUTO-ENTMCNC: 33.9 G/DL (ref 31.5–35.7)
MCV RBC AUTO: 89 FL (ref 79–97)
PLATELET # BLD AUTO: 265 X10E3/UL (ref 150–379)
POTASSIUM SERPL-SCNC: 4.4 MMOL/L (ref 3.5–5.2)
PROT SERPL-MCNC: 7.7 G/DL (ref 6–8.5)
RBC # BLD AUTO: 4.74 X10E6/UL (ref 4.14–5.8)
SODIUM SERPL-SCNC: 141 MMOL/L (ref 134–144)
TRIGL SERPL-MCNC: 48 MG/DL (ref 0–149)
VLDLC SERPL CALC-MCNC: 10 MG/DL (ref 5–40)
WBC # BLD AUTO: 4.4 X10E3/UL (ref 3.4–10.8)

## 2018-08-03 LAB — HCV AB S/CO SERPL IA: 0.2 S/CO RATIO (ref 0–0.9)

## 2018-08-05 NOTE — PROGRESS NOTES
Tell pt screening la for hep c is negative, normal blood cell counts, glucose,kidney liver lytes. Cholesterol levels are mildly elevated now--lower 1 year ago. Work on low fat diet and exericse.  Can repeat at next visit

## 2018-08-16 ENCOUNTER — OFFICE VISIT (OUTPATIENT)
Dept: INTERNAL MEDICINE CLINIC | Age: 63
End: 2018-08-16

## 2018-08-16 VITALS
WEIGHT: 224 LBS | HEART RATE: 79 BPM | HEIGHT: 70 IN | TEMPERATURE: 97.7 F | DIASTOLIC BLOOD PRESSURE: 72 MMHG | BODY MASS INDEX: 32.07 KG/M2 | SYSTOLIC BLOOD PRESSURE: 126 MMHG | OXYGEN SATURATION: 95 %

## 2018-08-16 DIAGNOSIS — E78.00 PURE HYPERCHOLESTEROLEMIA: ICD-10-CM

## 2018-08-16 DIAGNOSIS — I10 ESSENTIAL HYPERTENSION: Primary | ICD-10-CM

## 2018-08-16 NOTE — PROGRESS NOTES
HISTORY OF PRESENT ILLNESS  Kike Rodriguez is a 57 y.o. male. HPI     F/u HTN  bp was elevated last visit  Chol elevated  this month  Lost 7 lbs with diet and exercise  exerciese on ex bike and some weigth lifting a few days per week  Feels well       Patient Active Problem List    Diagnosis Date Noted    Situational stress 12/15/2017    Essential hypertension 01/26/2017    Moderate persistent asthma without complication 21/67/3710    Sepsis ([address] 04/29/2016    Pneumonia 04/29/2016    Right knee DJD     Nephrolithiasis     Colon polyp     Ventral hernia 12/05/2012    Seasonal allergic rhinitis 10/03/2012    Male hypogonadism 11/11/2011    ED (erectile dysfunction)      Current Outpatient Prescriptions   Medication Sig Dispense Refill    TURMERIC, BULK, by Does Not Apply route.  B.infantis-B.ani-B.long-B.bifi (PROBIOTIC 4X) 10-15 mg TbEC Take  by mouth.  folic acid (FOLVITE) 1 mg tablet Take  by mouth daily.  fluticasone (FLONASE) 50 mcg/actuation nasal spray USE 2 SPRAYS IN EACH NOSTRIL DAILY 3 Bottle 3    losartan-hydroCHLOROthiazide (HYZAAR) 50-12.5 mg per tablet TAKE 1 TABLET BY MOUTH EVERY DAY 90 Tab 3    montelukast (SINGULAIR) 10 mg tablet TAKE 1 TABLET BY MOUTH DAILY 90 Tab 3    mometasone-formoterol (DULERA) 200-5 mcg/actuation HFA inhaler Take 2 Puffs by inhalation two (2) times a day.  testosterone (ANDROGEL) 20.25 mg/1.25 gram (1.62 %) gel Apply 20.25 mg to affected area daily. Max Daily Amount: 20.25 mg. 3 Bottle 1    multivitamin, stress formula (STRESS TAB) tablet Take 1 Tab by mouth daily.  clomiPHENE (CLOMID) 50 mg tablet Take 50 mg by mouth daily.  diclofenac EC (VOLTAREN) 50 mg EC tablet Take 50 mg by mouth.  albuterol (VENTOLIN HFA) 90 mcg/actuation inhaler Take 2 puffs by inhalation every four (4) hours as needed for Wheezing.  1 Inhaler 5     Allergies   Allergen Reactions    Iodine Hives     IVP contrast/seafood      Lab Results  Component Value Date/Time   Cholesterol, total 211 (H) 08/01/2018 08:05 AM   HDL Cholesterol 43 08/01/2018 08:05 AM   LDL, calculated 158 (H) 08/01/2018 08:05 AM   Triglyceride 48 08/01/2018 08:05 AM   CHOL/HDL Ratio 4.2 08/14/2010 06:46 AM     Lab Results  Component Value Date/Time   GFR est non-AA 85 08/01/2018 08:05 AM   GFR est AA 99 08/01/2018 08:05 AM   Creatinine 0.94 08/01/2018 08:05 AM   BUN 15 08/01/2018 08:05 AM   Sodium 141 08/01/2018 08:05 AM   Potassium 4.4 08/01/2018 08:05 AM   Chloride 102 08/01/2018 08:05 AM   CO2 26 08/01/2018 08:05 AM        ROS    Physical Exam   Constitutional: He appears well-developed and well-nourished. No distress. Appears stated age   HENT:   Head: Normocephalic. Cardiovascular: Normal rate, regular rhythm and normal heart sounds. Exam reveals no gallop and no friction rub. No murmur heard. Pulmonary/Chest: Effort normal and breath sounds normal.   Abdominal: Soft. Musculoskeletal: He exhibits no edema. Neurological: He is alert. Psychiatric: He has a normal mood and affect. Nursing note and vitals reviewed. ASSESSMENT and PLAN  Diagnoses and all orders for this visit:    1. Essential hypertension   Controlled , continue hyzaar  2. Pure hypercholesterolemia   Continue diet and exercise    Follow-up Disposition:  Return in about 6 months (around 2/16/2019) for htn hld-labs.

## 2018-08-16 NOTE — MR AVS SNAPSHOT
[address] [facility] 
[phone] Patient: Sonia Lee Sr. MRN:  :[id number] Visit Information Date & Time Provider Department Dept. Phone Encounter #  
 2018  4:00 PM Chris Longo, [address] [place] [address],[address] [phone] [id number] Follow-up Instructions Return in about 6 months (around 2019) for htn hld-labs. Upcoming Health Maintenance Date Due Influenza Age 5 to Adult 2018 DTaP/Tdap/Td series (2 - Td) 2021 COLONOSCOPY 2024 Allergies as of 2018  Review Complete On: 2018 By: Miranda Riedel, LPN Severity Noted Reaction Type Reactions Iodine  2010    Hives IVP contrast/seafood Current Immunizations  Reviewed on 10/18/2016 Name Date Influenza Vaccine 10/18/2017, 10/16/2016, 2015, 10/22/2014  2:15 PM, 10/14/2013 Influenza Vaccine Split 10/3/2012, 2011, 2010 Pneumococcal Conjugate (PCV-13) 10/26/2016 TDAP Vaccine 2011, 2010 ZZZ-RETIRED (DO NOT USE) Pneumococcal Vaccine (Unspecified Type) 2010 Zoster Vaccine, Live 2013 Not reviewed this visit Vitals BP Pulse Temp Height(growth percentile) Weight(growth percentile) SpO2  
 126/72 (BP 1 Location: Left arm, BP Patient Position: Sitting) 79 97.7 °F (36.5 °C) (Oral) 5' 10\" (1.778 m) 224 lb (101.6 kg) 95% BMI Smoking Status 32.14 kg/m2 Former Smoker BMI and BSA Data Body Mass Index Body Surface Area  
 32.14 kg/m 2 2.24 m 2 Preferred Pharmacy Pharmacy Name Phone [address] [facility] [place] [phone] Your Updated Medication List  
  
   
This list is accurate as of 18  4:16 PM.  Always use your most recent med list.  
  
  
  
  
 albuterol 90 mcg/actuation inhaler Commonly known as:  VENTOLIN HFA  
 Take 2 puffs by inhalation every four (4) hours as needed for Wheezing. clomiPHENE 50 mg tablet Commonly known as:  CLOMID Take 50 mg by mouth daily. diclofenac EC 50 mg EC tablet Commonly known as:  VOLTAREN Take 50 mg by mouth. DULERA 200-5 mcg/actuation HFA inhaler Generic drug:  mometasone-formoterol Take 2 Puffs by inhalation two (2) times a day. fluticasone 50 mcg/actuation nasal spray Commonly known as:  FLONASE  
USE 2 SPRAYS IN EACH NOSTRIL DAILY  
  
 folic acid 1 mg tablet Commonly known as:  Google Take  by mouth daily. losartan-hydroCHLOROthiazide 50-12.5 mg per tablet Commonly known as:  HYZAAR  
TAKE 1 TABLET BY MOUTH EVERY DAY  
  
 montelukast 10 mg tablet Commonly known as:  SINGULAIR  
TAKE 1 TABLET BY MOUTH DAILY  
  
 multivitamin, stress formula tablet Commonly known as:  STRESS TAB Take 1 Tab by mouth daily. PROBIOTIC 4X 10-15 mg Tbec Generic drug:  B.infantis-B.ani-B.long-B.bifi Take  by mouth. testosterone 20.25 mg/1.25 gram (1.62 %) gel Commonly known as:  ANDROGEL Apply 20.25 mg to affected area daily. Max Daily Amount: 20.25 mg.  
  
 TURMERIC (BULK)  
by Does Not Apply route. Follow-up Instructions Return in about 6 months (around 2/16/2019) for htn hld-labs. [facility]! Dear Abad Ramirez: Thank you for requesting a [facility] account. Our records indicate that you already have an active [facility] account. You can access your account anytime at [email or web address] Did you know that you can access your hospital and ER discharge instructions at any time in [facility]? You can also review all of your test results from your hospital stay or ER visit. Additional Information If you have questions, please visit the Frequently Asked Questions section of the [facility] website at [email or web address] Remember, MyChart is NOT to be used for urgent needs. For medical emergencies, dial 911. Now available from your iPhone and Android! Please provide this summary of care documentation to your next provider. Your primary care clinician is listed as Kayy SHANE. If you have any questions after today's visit, please call [phone].

## 2018-10-11 ENCOUNTER — OFFICE VISIT (OUTPATIENT)
Dept: INTERNAL MEDICINE CLINIC | Age: 63
End: 2018-10-11

## 2018-10-11 VITALS
SYSTOLIC BLOOD PRESSURE: 131 MMHG | WEIGHT: 225 LBS | HEART RATE: 77 BPM | DIASTOLIC BLOOD PRESSURE: 83 MMHG | HEIGHT: 70 IN | BODY MASS INDEX: 32.21 KG/M2 | OXYGEN SATURATION: 95 % | TEMPERATURE: 97.4 F

## 2018-10-11 DIAGNOSIS — Z23 ENCOUNTER FOR IMMUNIZATION: ICD-10-CM

## 2018-10-11 DIAGNOSIS — Z01.818 PREOP EXAMINATION: Primary | ICD-10-CM

## 2018-10-11 DIAGNOSIS — J45.30 MILD PERSISTENT ASTHMA, UNSPECIFIED WHETHER COMPLICATED: ICD-10-CM

## 2018-10-11 DIAGNOSIS — I10 ESSENTIAL HYPERTENSION: ICD-10-CM

## 2018-10-11 DIAGNOSIS — M17.10 ARTHRITIS OF KNEE: ICD-10-CM

## 2018-10-11 PROBLEM — E78.00 PURE HYPERCHOLESTEROLEMIA: Status: ACTIVE | Noted: 2018-10-11

## 2018-10-11 NOTE — MR AVS SNAPSHOT
[address] [facility] 
[phone] Patient: Avril Foss Sr. MRN:  :[id number] Visit Information Date & Time Provider Department Dept. Phone Encounter #  
 10/11/2018  1:30 PM Chandu Parsons [facility] [phone] [id number] Follow-up Instructions Return in about 3 months (around 2019). Your Appointments 2019  9:00 AM  
ROUTINE CARE with [facility]u Parsons [facility] Appt Note: 6 month follow up  
 [facility] [address] [address]  
[address] [facility] Upcoming Health Maintenance Date Due Shingrix Vaccine Age 50> (1 of 2) 2003 Influenza Age 5 to Adult 2018 DTaP/Tdap/Td series (2 - Td) 2021 COLONOSCOPY 2024 Allergies as of 10/11/2018  Review Complete On: 10/11/2018 By: Aroldo Coronel LPN Severity Noted Reaction Type Reactions Iodine  2010    Hives IVP contrast/seafood Current Immunizations  Reviewed on 10/18/2016 Name Date Influenza Vaccine 10/18/2017, 10/16/2016, 2015, 10/22/2014  2:15 PM, 10/14/2013 Influenza Vaccine (Quad) PF  Incomplete Influenza Vaccine Split 10/3/2012, 2011, 2010 Pneumococcal Conjugate (PCV-13) 10/26/2016 TDAP Vaccine 2011, 2010 ZZZ-RETIRED (DO NOT USE) Pneumococcal Vaccine (Unspecified Type) 2010 Zoster Vaccine, Live 2013 Not reviewed this visit You Were Diagnosed With   
  
 Codes Comments Preop examination    -  Primary ICD-10-CM: I12.486 ICD-9-CM: V72.84 Essential hypertension     ICD-10-CM: I10 
ICD-9-CM: 401.9 Mild persistent asthma, unspecified whether complicated     SQN-45-MT: J45.30 ICD-9-CM: 493.90 Arthritis of knee     ICD-10-CM: M17.10 ICD-9-CM: 716.96 Encounter for immunization     ICD-10-CM: Y11 ICD-9-CM: V03.89 Vitals BP Pulse Temp Height(growth percentile) Weight(growth percentile) SpO2  
 131/83 (BP 1 Location: Left arm, BP Patient Position: Sitting) 77 97.4 °F (36.3 °C) (Oral) 5' 10\" (1.778 m) 225 lb (102.1 kg) 95% BMI Smoking Status 32.28 kg/m2 Former Smoker BMI and BSA Data Body Mass Index Body Surface Area  
 32.28 kg/m 2 2.25 m 2 Preferred Pharmacy Pharmacy Name Phone 99 [facility] [address] [phone] Your Updated Medication List  
  
   
This list is accurate as of 10/11/18  2:08 PM.  Always use your most recent med list.  
  
  
  
  
 albuterol 90 mcg/actuation inhaler Commonly known as:  VENTOLIN HFA Take 2 puffs by inhalation every four (4) hours as needed for Wheezing. clomiPHENE 50 mg tablet Commonly known as:  CLOMID Take 50 mg by mouth daily. diclofenac EC 50 mg EC tablet Commonly known as:  VOLTAREN Take 50 mg by mouth. DULERA 200-5 mcg/actuation HFA inhaler Generic drug:  mometasone-formoterol Take 2 Puffs by inhalation two (2) times a day. fluticasone 50 mcg/actuation nasal spray Commonly known as:  FLONASE  
USE 2 SPRAYS IN EACH NOSTRIL DAILY  
  
 folic acid 1 mg tablet Commonly known as:  Google Take  by mouth daily. losartan-hydroCHLOROthiazide 50-12.5 mg per tablet Commonly known as:  HYZAAR  
TAKE 1 TABLET BY MOUTH EVERY DAY  
  
 montelukast 10 mg tablet Commonly known as:  SINGULAIR  
TAKE 1 TABLET BY MOUTH DAILY  
  
 multivitamin, stress formula tablet Commonly known as:  STRESS TAB Take 1 Tab by mouth daily. PROBIOTIC 4X 10-15 mg Tbec Generic drug:  B.infantis-B.ani-B.long-B.bifi Take  by mouth. testosterone 20.25 mg/1.25 gram (1.62 %) gel Commonly known as:  ANDROGEL  
 Apply 20.25 mg to affected area daily. Max Daily Amount: 20.25 mg.  
  
 TURMERIC (BULK)  
by Does Not Apply route. We Performed the Following AMB POC EKG ROUTINE W/ 12 LEADS, INTER & REP [05113 CPT(R)] INFLUENZA VIRUS VAC QUAD,SPLIT,PRESV FREE SYRINGE IM [id number] CPT(R)] Follow-up Instructions Return in about 3 months (around 1/11/2019). Introducing [facility]! Dear Brinda Painter: Thank you for requesting a [facility] account. Our records indicate that you already have an active [employer] account. You can access your account anytime at [email or web address]. [email or web address] Did you know that you can access your hospital and ER discharge instructions at any time in [employer]? You can also review all of your test results from your hospital stay or ER visit. Additional Information If you have questions, please visit the Frequently Asked Questions section of the [employer] website at [email or web address] At The [email or web address]/. Remember, [employer] is NOT to be used for urgent needs. For medical emergencies, dial 911. Now available from your iPhone and Android! Please provide this summary of care documentation to your next provider. Your primary care clinician is listed as Rodrigue SHANE. If you have any questions after today's visit, please call [phone].

## 2018-10-11 NOTE — PROGRESS NOTES
HISTORY OF PRESENT ILLNESS Adriel Larsen is a 57 y.o. male. HPI Here for preop TKR--Dr Hou, surgery scheduled 10/29/18 at [facility] teri 
Spinal asnesthesia planned Hx htn mild hld asthma obesity bmi 32 Has had prior hernia sx and penile implant--no prior anesthesia problems No asthma flares ups this year Able to go up 1-2 flights of stairs--no cp or sob sxs Uses exercise bike for 30 min 2 times per week Last OV 
 
F/u HTN 
bp was elevated last visit Chol elevated  this month Lost 7 lbs with diet and exercise 
exerciese on ex bike and some weigth lifting a few days per week Feels well Patient Active Problem List  
 Diagnosis Date Noted  Pure hypercholesterolemia 10/11/2018  Situational stress 12/15/2017  Essential hypertension 01/26/2017  Moderate persistent asthma without complication 55/97/7781  Sepsis ([address] 04/29/2016  Pneumonia 04/29/2016  Right knee DJD  Nephrolithiasis  Colon polyp  Ventral hernia 12/05/2012  Seasonal allergic rhinitis 10/03/2012 Corvinus.Nicolasa Male hypogonadism 11/11/2011  ED (erectile dysfunction) Current Outpatient Prescriptions Medication Sig Dispense Refill  TURMERIC, BULK, by Does Not Apply route.  B.infantis-B.ani-B.long-B.bifi (PROBIOTIC 4X) 10-15 mg TbEC Take  by mouth.  clomiPHENE (CLOMID) 50 mg tablet Take 50 mg by mouth daily.  folic acid (FOLVITE) 1 mg tablet Take  by mouth daily.  fluticasone (FLONASE) 50 mcg/actuation nasal spray USE 2 SPRAYS IN EACH NOSTRIL DAILY 3 Bottle 3  
 losartan-hydroCHLOROthiazide (HYZAAR) 50-12.5 mg per tablet TAKE 1 TABLET BY MOUTH EVERY DAY 90 Tab 3  
 montelukast (SINGULAIR) 10 mg tablet TAKE 1 TABLET BY MOUTH DAILY 90 Tab 3  
 diclofenac EC (VOLTAREN) 50 mg EC tablet Take 50 mg by mouth.  mometasone-formoterol (DULERA) 200-5 mcg/actuation HFA inhaler Take 2 Puffs by inhalation two (2) times a day.  testosterone (ANDROGEL) 20.25 mg/1.25 gram (1.62 %) gel Apply 20.25 mg to affected area daily. Max Daily Amount: 20.25 mg. 3 Bottle 1  
 albuterol (VENTOLIN HFA) 90 mcg/actuation inhaler Take 2 puffs by inhalation every four (4) hours as needed for Wheezing. 1 Inhaler 5  
 multivitamin, stress formula (STRESS TAB) tablet Take 1 Tab by mouth daily. Allergies Allergen Reactions  Iodine Hives IVP contrast/seafood Past Medical History:  
Diagnosis Date  Alcohol dependence ([address]  Asthma X 27, seasonal   
 Colon polyp  ED (erectile dysfunction)  GERD (gastroesophageal reflux disease) after meals only, diet controlled no meds  Hypertension  IBS (irritable bowel syndrome)  Nephrolithiasis  Positive PPD  Right knee DJD  Seasonal allergic rhinitis  Ventral hernia 12/5/2012 Past Surgical History:  
Procedure Laterality Date  HX COLONOSCOPY  2014  
 due 2019  HX VASECTOMY  AL PROSTATE BIOPSY, NEEDLE, SATURATION SAMPLING  2012  
 prostate bx   
 PROSTHESIS, PENILE, INFLATAB Family History Problem Relation Age of Onset  Alcohol abuse Father  Diabetes Father  Cancer Father   
  larynx  Hypertension Mother  Alcohol abuse Paternal Grandmother  Diabetes Paternal Grandmother Social History Substance Use Topics  Smoking status: Former Smoker Packs/day: 1.00 Years: 12.00 Quit date: 9/1/1981  Smokeless tobacco: Never Used  Alcohol use Yes Comment: 6 glasses per month Lab Results Component Value Date/Time WBC 4.4 08/01/2018 08:05 AM  
HGB 14.3 08/01/2018 08:05 AM  
HCT 42.2 08/01/2018 08:05 AM  
PLATELET 204 24/67/7615 08:05 AM  
MCV 89 08/01/2018 08:05 AM  
 
Lab Results Component Value Date/Time GFR est non-AA 85 08/01/2018 08:05 AM  
GFR est AA 99 08/01/2018 08:05 AM  
Creatinine 0.94 08/01/2018 08:05 AM  
BUN 15 08/01/2018 08:05 AM  
Sodium 141 08/01/2018 08:05 AM  
 Potassium 4.4 08/01/2018 08:05 AM  
Chloride 102 08/01/2018 08:05 AM  
CO2 26 08/01/2018 08:05 AM  
  
 
Review of Systems Constitutional: Negative for chills, fever, malaise/fatigue and weight loss. Eyes: Negative for blurred vision and double vision. Respiratory: Negative for cough and shortness of breath. Cardiovascular: Negative for chest pain and palpitations. Gastrointestinal: Negative for abdominal pain, blood in stool, constipation, diarrhea, melena, nausea and vomiting. Genitourinary: Negative for dysuria, frequency, hematuria and urgency. Musculoskeletal: Positive for joint pain. Negative for back pain, falls and myalgias. Neurological: Negative for dizziness, tremors and headaches. Physical Exam  
Constitutional: He appears well-developed and well-nourished. No distress. Appears stated age, obese HENT:  
Head: Normocephalic. Eyes: Pupils are equal, round, and reactive to light. Neck: Normal range of motion. Cardiovascular: Normal rate and regular rhythm. Exam reveals no gallop and no friction rub. No murmur heard. Pulmonary/Chest: Effort normal and breath sounds normal. No respiratory distress. He has no wheezes. He has no rales. He exhibits no tenderness. Abdominal: Soft. He exhibits no distension and no mass. There is no tenderness. There is no rebound and no guarding. Musculoskeletal: He exhibits no edema. Neurological: He is alert. Psychiatric: He has a normal mood and affect. Nursing note and vitals reviewed. ASSESSMENT and PLAN Diagnoses and all orders for this visit: 1. Preop examination -     AMB POC EKG ROUTINE W/ 12 LEADS, INTER & REP=--nsr wnl Acceptable candidate and risk for intermediate risk surgery 2. Essential hypertension -     AMB POC EKG ROUTINE W/ 12 LEADS, INTER & REP 
 controlled 3. Mild persistent asthma, unspecified whether complicated 
 quiet 4. Arthritis of knee Advised weight reduction 5. Encounter for immunization -     Influenza virus vaccine (QUADRIVALENT PRES FREE SYRINGE) IM (85741) 6. Obesity I have reviewed/discussed the above normal BMI with the patient. I have recommended the following interventions: dietary management education, guidance, and counseling . Wanda Enriquez Follow-up Disposition: 
Return in about 3 months (around 1/11/2019).

## 2018-10-11 NOTE — PROGRESS NOTES
Chief Complaint Patient presents with  Pre-op Exam  
  Left Knee surgery, Date: 10/29/18 with Dr. Erik Mercado

## 2019-02-15 ENCOUNTER — OFFICE VISIT (OUTPATIENT)
Dept: INTERNAL MEDICINE CLINIC | Age: 64
End: 2019-02-15

## 2019-02-15 VITALS
SYSTOLIC BLOOD PRESSURE: 144 MMHG | DIASTOLIC BLOOD PRESSURE: 85 MMHG | HEIGHT: 70 IN | BODY MASS INDEX: 33.36 KG/M2 | OXYGEN SATURATION: 96 % | HEART RATE: 78 BPM | TEMPERATURE: 97.8 F | WEIGHT: 233 LBS

## 2019-02-15 DIAGNOSIS — I10 ESSENTIAL HYPERTENSION: Primary | ICD-10-CM

## 2019-02-15 DIAGNOSIS — J40 BRONCHITIS: ICD-10-CM

## 2019-02-15 DIAGNOSIS — Z23 ENCOUNTER FOR IMMUNIZATION: ICD-10-CM

## 2019-02-15 DIAGNOSIS — E78.00 PURE HYPERCHOLESTEROLEMIA: ICD-10-CM

## 2019-02-15 RX ORDER — AZITHROMYCIN 250 MG/1
250 TABLET, FILM COATED ORAL SEE ADMIN INSTRUCTIONS
Qty: 6 TAB | Refills: 0 | Status: SHIPPED | OUTPATIENT
Start: 2019-02-15 | End: 2019-02-20

## 2019-02-15 NOTE — PROGRESS NOTES
HISTORY OF PRESENT ILLNESS Kolby Khanna is a 70 y.o. male. HPI  
F/u htn hld ( not on medication)asthma obesity s/p left TKR since last OV-Dr Hou Rides stationary bike in mornings and PT for knee Due for pneumovax 23-------------- Sees KATIE MALLORY for low testosterone and PSA Sees Dr Santi Roe for asthma Coughing up mucous recently---abx augmentin and prednisone 1/14/19 Last OV Here for preop TKR--Dr Hou, surgery scheduled 10/29/18 at [facility] teri 
Spinal asnesthesia planned Hx htn mild hld asthma obesity bmi 30 
  
Has had prior hernia sx and penile implant--no prior anesthesia problems No asthma flares ups this year Able to go up 1-2 flights of stairs--no cp or sob sxs Uses exercise bike for 30 min 2 times per week Last OV 
  
F/u HTN 
bp was elevated last visit Chol elevated  this month Lost 7 lbs with diet and exercise 
exerciese on ex bike and some weigth lifting a few days per week Feels well  
  
    
Patient Active Problem List  
 
 
 
 
 
Patient Active Problem List  
 Diagnosis Date Noted  Pure hypercholesterolemia 10/11/2018  Situational stress 12/15/2017  Essential hypertension 01/26/2017  Moderate persistent asthma without complication 67/24/3629  Sepsis ([facility] [address] 04/29/2016  Pneumonia 04/29/2016  Right knee DJD  Nephrolithiasis  Colon polyp  Ventral hernia 12/05/2012  Seasonal allergic rhinitis 10/03/2012 Maldonado Male hypogonadism 11/11/2011  ED (erectile dysfunction) Current Outpatient Medications Medication Sig Dispense Refill  TURMERIC, BULK, by Does Not Apply route.  B.infantis-B.ani-B.long-B.bifi (PROBIOTIC 4X) 10-15 mg TbEC Take  by mouth.  clomiPHENE (CLOMID) 50 mg tablet Take 50 mg by mouth daily.  folic acid (FOLVITE) 1 mg tablet Take  by mouth daily.     
 fluticasone (FLONASE) 50 mcg/actuation nasal spray USE 2 SPRAYS IN EACH NOSTRIL DAILY 3 Bottle 3  
  losartan-hydroCHLOROthiazide (HYZAAR) 50-12.5 mg per tablet TAKE 1 TABLET BY MOUTH EVERY DAY 90 Tab 3  
 montelukast (SINGULAIR) 10 mg tablet TAKE 1 TABLET BY MOUTH DAILY 90 Tab 3  
 diclofenac EC (VOLTAREN) 50 mg EC tablet Take 50 mg by mouth.  mometasone-formoterol (DULERA) 200-5 mcg/actuation HFA inhaler Take 2 Puffs by inhalation two (2) times a day.  testosterone (ANDROGEL) 20.25 mg/1.25 gram (1.62 %) gel Apply 20.25 mg to affected area daily. Max Daily Amount: 20.25 mg. 3 Bottle 1  
 albuterol (VENTOLIN HFA) 90 mcg/actuation inhaler Take 2 puffs by inhalation every four (4) hours as needed for Wheezing. 1 Inhaler 5  
 multivitamin, stress formula (STRESS TAB) tablet Take 1 Tab by mouth daily. Allergies Allergen Reactions  Iodine Hives IVP contrast/seafood Lab Results Component Value Date/Time Hemoglobin A1c 5.8 (H) 11/11/2011 08:54 AM  
 Glucose 94 08/01/2018 08:05 AM  
 Microalb/Creat ratio (ug/mg creat.) 11.4 08/26/2014 09:25 AM  
 LDL, calculated 158 (H) 08/01/2018 08:05 AM  
 Creatinine 0.94 08/01/2018 08:05 AM  
  
Lab Results Component Value Date/Time Cholesterol, total 211 (H) 08/01/2018 08:05 AM  
 HDL Cholesterol 43 08/01/2018 08:05 AM  
 LDL, calculated 158 (H) 08/01/2018 08:05 AM  
 Triglyceride 48 08/01/2018 08:05 AM  
 CHOL/HDL Ratio 4.2 08/14/2010 06:46 AM  
 
Lab Results Component Value Date/Time GFR est non-AA 85 08/01/2018 08:05 AM  
 GFR est AA 99 08/01/2018 08:05 AM  
 Creatinine 0.94 08/01/2018 08:05 AM  
 BUN 15 08/01/2018 08:05 AM  
 Sodium 141 08/01/2018 08:05 AM  
 Potassium 4.4 08/01/2018 08:05 AM  
 Chloride 102 08/01/2018 08:05 AM  
 CO2 26 08/01/2018 08:05 AM  
  
ROS Physical Exam  
Constitutional: He appears well-developed and well-nourished. No distress. Appears stated age, obese, nad HENT:  
Head: Normocephalic. Cardiovascular: Normal rate, regular rhythm and normal heart sounds.  Exam reveals no gallop and no friction rub. No murmur heard. Pulmonary/Chest: Effort normal and breath sounds normal. No respiratory distress. He has no wheezes. He has no rales. He exhibits no tenderness. Abdominal: Soft. Musculoskeletal: He exhibits no edema. Left knee effusion Neurological: He is alert. Psychiatric: He has a normal mood and affect. Nursing note and vitals reviewed. ASSESSMENT and PLAN Diagnoses and all orders for this visit: 1. Essential hypertension -     METABOLIC PANEL, BASIC Reasonable control 2. Pure hypercholesterolemia -     LIPID PANEL 
-     METABOLIC PANEL, BASIC Try to manage with diet and exercise 3. Bronchitis 
 zpak 4. Preventive Pneumovax 23 today Other orders 
-     azithromycin (ZITHROMAX) 250 mg tablet; Take 1 Tab by mouth See Admin Instructions for 5 days. Follow-up Disposition: 
Return in about 6 months (around 8/15/2019) for htn hld .

## 2019-02-15 NOTE — PROGRESS NOTES
Chief Complaint Patient presents with  Hypertension 6 month follow up  Cholesterol Problem 6 month follow up  Labs Fasting

## 2019-02-16 LAB
BUN SERPL-MCNC: 16 MG/DL (ref 8–27)
BUN/CREAT SERPL: 16 (ref 10–24)
CALCIUM SERPL-MCNC: 9.6 MG/DL (ref 8.6–10.2)
CHLORIDE SERPL-SCNC: 102 MMOL/L (ref 96–106)
CHOLEST SERPL-MCNC: 190 MG/DL (ref 100–199)
CO2 SERPL-SCNC: 23 MMOL/L (ref 20–29)
CREAT SERPL-MCNC: 0.97 MG/DL (ref 0.76–1.27)
GLUCOSE SERPL-MCNC: 92 MG/DL (ref 65–99)
HDLC SERPL-MCNC: 38 MG/DL
LDLC SERPL CALC-MCNC: 143 MG/DL (ref 0–99)
POTASSIUM SERPL-SCNC: 4.3 MMOL/L (ref 3.5–5.2)
SODIUM SERPL-SCNC: 142 MMOL/L (ref 134–144)
TRIGL SERPL-MCNC: 46 MG/DL (ref 0–149)
VLDLC SERPL CALC-MCNC: 9 MG/DL (ref 5–40)

## 2019-02-22 RX ORDER — ATORVASTATIN CALCIUM 10 MG/1
10 TABLET, FILM COATED ORAL DAILY
Qty: 90 TAB | Refills: 0 | Status: SHIPPED | OUTPATIENT
Start: 2019-02-22 | End: 2019-05-15 | Stop reason: SDUPTHER

## 2019-04-24 ENCOUNTER — OFFICE VISIT (OUTPATIENT)
Dept: INTERNAL MEDICINE CLINIC | Age: 64
End: 2019-04-24

## 2019-04-24 VITALS
WEIGHT: 240.2 LBS | HEART RATE: 85 BPM | DIASTOLIC BLOOD PRESSURE: 79 MMHG | TEMPERATURE: 98.1 F | OXYGEN SATURATION: 99 % | HEIGHT: 70 IN | BODY MASS INDEX: 34.39 KG/M2 | SYSTOLIC BLOOD PRESSURE: 139 MMHG | RESPIRATION RATE: 18 BRPM

## 2019-04-24 DIAGNOSIS — J34.0 CELLULITIS OF NOSE: ICD-10-CM

## 2019-04-24 DIAGNOSIS — E78.00 PURE HYPERCHOLESTEROLEMIA: Primary | ICD-10-CM

## 2019-04-24 DIAGNOSIS — I10 ESSENTIAL HYPERTENSION: ICD-10-CM

## 2019-04-24 RX ORDER — CEPHALEXIN 500 MG/1
500 CAPSULE ORAL 4 TIMES DAILY
Qty: 28 CAP | Refills: 0 | Status: SHIPPED | OUTPATIENT
Start: 2019-04-24 | End: 2019-10-24 | Stop reason: ALTCHOICE

## 2019-04-24 RX ORDER — OLMESARTAN MEDOXOMIL AND HYDROCHLOROTHIAZIDE 20/12.5 20; 12.5 MG/1; MG/1
1 TABLET ORAL DAILY
Qty: 90 TAB | Refills: 3 | Status: SHIPPED | OUTPATIENT
Start: 2019-04-24 | End: 2020-01-15 | Stop reason: SDUPTHER

## 2019-04-24 NOTE — PROGRESS NOTES
Reviewed record in preparation for visit and have obtained necessary documentation. Identified pt with two pt identifiers(name and ). Chief Complaint Patient presents with  Hypertension  Rash  Medication Evaluation Health Maintenance Due Topic Date Due  Shingles Vaccine (1 of 2) 2003  Glaucoma Screening   2018  AAA Screening  2018 Mr. Reji Gonsales has a reminder for a \"due or due soon\" health maintenance. I have asked that he discuss this further with his primary care provider for follow-up on this health maintenance. Coordination of Care Questionnaire: 
:  
 
1) Have you been to an emergency room, urgent care clinic since your last visit? no  
Hospitalized since your last visit? no          
 
2) Have you seen or consulted any other health care providers outside of [address] [place] [address] since your last visit? no  (Include any pap smears or colon screenings in this section.) 3) In the event something were to happen to you and you were unable to speak on your behalf, do you have an Advance Directive/ Living Will in place stating your wishes? NO Do you have an Advance Directive on file? no 
 
4) Are you interested in receiving information on Advance Directives? NO Patient is accompanied by self I have received verbal consent from Kike Turner. to discuss any/all medical information while they are present in the room.

## 2019-04-24 NOTE — PROGRESS NOTES
HISTORY OF PRESENT ILLNESS Ana Gonzalez is a 70 y.o. male. HPI  
 
F/u htn hld asthma obesity Last  so started on lipitor 10 mg every day Tolerating lipitor Pt wants to try another ARB than losartan due to recall of medication Has rash on his nose --used paper towel last week to blow nose, next day noted redness and blisters. No pus. Not tender Last OV 
 s/p left TKR since last OV-Dr Hou Rides stationary bike in mornings and PT for knee Due for pneumovax 23-------------- 
  
Sees KATIE MALLORY for low testosterone and PSA Sees Dr Carmen Rose for asthma 
  
  
Coughing up mucous recently---abx augmentin and prednisone 1/14/19 Last OV 
  
  
Here for preop TKR--Dr Hou, surgery scheduled 10/29/18 at [facility] teri 
Spinal asnesthesia planned Hx htn mild hld asthma obesity bmi 30 
  
Has had prior hernia sx and penile implant--no prior anesthesia problems No asthma flares ups this year Able to go up 1-2 flights of stairs--no cp or sob sxs Uses exercise bike for 30 min 2 times per week Patient Active Problem List  
 Diagnosis Date Noted  Pure hypercholesterolemia 10/11/2018  Situational stress 12/15/2017  Essential hypertension 01/26/2017  Moderate persistent asthma without complication 93/10/7425  Sepsis ([address] 04/29/2016  Pneumonia 04/29/2016  Right knee DJD  Nephrolithiasis  Colon polyp  Ventral hernia 12/05/2012  Seasonal allergic rhinitis 10/03/2012 [address] [place] [address] Male hypogonadism 11/11/2011  ED (erectile dysfunction) Current Outpatient Medications Medication Sig Dispense Refill  atorvastatin (LIPITOR) 10 mg tablet Take 1 Tab by mouth daily. 90 Tab 0  TURMERIC, BULK, by Does Not Apply route.  B.infantis-B.ani-B.long-B.bifi (PROBIOTIC 4X) 10-15 mg TbEC Take  by mouth.  clomiPHENE (CLOMID) 50 mg tablet Take 50 mg by mouth daily.  folic acid (FOLVITE) 1 mg tablet Take  by mouth daily.  fluticasone (FLONASE) 50 mcg/actuation nasal spray USE 2 SPRAYS IN EACH NOSTRIL DAILY 3 Bottle 3  
 losartan-hydroCHLOROthiazide (HYZAAR) 50-12.5 mg per tablet TAKE 1 TABLET BY MOUTH EVERY DAY 90 Tab 3  
 montelukast (SINGULAIR) 10 mg tablet TAKE 1 TABLET BY MOUTH DAILY 90 Tab 3  
 diclofenac EC (VOLTAREN) 50 mg EC tablet Take 50 mg by mouth.  mometasone-formoterol (DULERA) 200-5 mcg/actuation HFA inhaler Take 2 Puffs by inhalation two (2) times a day.  testosterone (ANDROGEL) 20.25 mg/1.25 gram (1.62 %) gel Apply 20.25 mg to affected area daily. Max Daily Amount: 20.25 mg. 3 Bottle 1  
 albuterol (VENTOLIN HFA) 90 mcg/actuation inhaler Take 2 puffs by inhalation every four (4) hours as needed for Wheezing. 1 Inhaler 5  
 multivitamin, stress formula (STRESS TAB) tablet Take 1 Tab by mouth daily. Allergies Allergen Reactions  Iodine Hives IVP contrast/seafood Lab Results Component Value Date/Time Hemoglobin A1c 5.8 (H) 11/11/2011 08:54 AM  
 Glucose 92 02/15/2019 09:45 AM  
 Microalb/Creat ratio (ug/mg creat.) 11.4 08/26/2014 09:25 AM  
 LDL, calculated 143 (H) 02/15/2019 09:45 AM  
 Creatinine 0.97 02/15/2019 09:45 AM  
  
Lab Results Component Value Date/Time Cholesterol, total 190 02/15/2019 09:45 AM  
 HDL Cholesterol 38 (L) 02/15/2019 09:45 AM  
 LDL, calculated 143 (H) 02/15/2019 09:45 AM  
 Triglyceride 46 02/15/2019 09:45 AM  
 CHOL/HDL Ratio 4.2 08/14/2010 06:46 AM  
 
Lab Results Component Value Date/Time GFR est non-AA 82 02/15/2019 09:45 AM  
 GFR est AA 94 02/15/2019 09:45 AM  
 Creatinine 0.97 02/15/2019 09:45 AM  
 BUN 16 02/15/2019 09:45 AM  
 Sodium 142 02/15/2019 09:45 AM  
 Potassium 4.3 02/15/2019 09:45 AM  
 Chloride 102 02/15/2019 09:45 AM  
 CO2 23 02/15/2019 09:45 AM  
  
ROS Physical Exam  
Constitutional: He appears well-developed and well-nourished. No distress. Appears stated age HENT:  
Head: Normocephalic. Red nose with edema and dry blisters Cardiovascular: Normal rate, regular rhythm and normal heart sounds. Exam reveals no gallop and no friction rub. No murmur heard. Pulmonary/Chest: Effort normal and breath sounds normal. No respiratory distress. He has no wheezes. He has no rales. He exhibits no tenderness. Abdominal: Soft. Musculoskeletal: He exhibits no edema. Neurological: He is alert. Psychiatric: He has a normal mood and affect. Nursing note and vitals reviewed. ASSESSMENT and PLAN Diagnoses and all orders for this visit: 1. Pure hypercholesterolemia -     LIPID PANEL 
-     AST 
-     ALT On lipitor 2. Cellulitis of nose Keflex qid x 7d 3. Essential hypertension Change lhyzaar to benicar/hct Other orders 
-     olmesartan-hydroCHLOROthiazide (BENICAR HCT) 20-12.5 mg per tablet; Take 1 Tab by mouth daily. -     cephALEXin (KEFLEX) 500 mg capsule; Take 1 Cap by mouth four (4) times daily. Follow-up and Dispositions · Return in about 6 months (around 10/24/2019) for htn hld labs.

## 2019-04-25 LAB
ALT SERPL-CCNC: 15 IU/L (ref 0–44)
AST SERPL-CCNC: 19 IU/L (ref 0–40)
CHOLEST SERPL-MCNC: 147 MG/DL (ref 100–199)
HDLC SERPL-MCNC: 36 MG/DL
LDLC SERPL CALC-MCNC: 100 MG/DL (ref 0–99)
TRIGL SERPL-MCNC: 53 MG/DL (ref 0–149)
VLDLC SERPL CALC-MCNC: 11 MG/DL (ref 5–40)

## 2019-05-07 ENCOUNTER — TELEPHONE (OUTPATIENT)
Dept: INTERNAL MEDICINE CLINIC | Age: 64
End: 2019-05-07

## 2019-05-07 NOTE — TELEPHONE ENCOUNTER
Pt returned call to the office (unsure of reason).  Best [phone]         Message received & copied from [facility]

## 2019-05-07 NOTE — TELEPHONE ENCOUNTER
Patient states he needs a call back in reference to getting an appt this Friday morning 5/10/19 with Dr. Buster Dooley for Foot swelling & skin irritation/itching that patient is concerned could possibly be his medication. Patient is off from work this Friday/date requested. Please call to discuss. No appts available this week.  Thank you

## 2019-05-15 RX ORDER — ATORVASTATIN CALCIUM 10 MG/1
10 TABLET, FILM COATED ORAL DAILY
Qty: 90 TAB | Refills: 1 | Status: SHIPPED | OUTPATIENT
Start: 2019-05-15 | End: 2019-07-05 | Stop reason: SDUPTHER

## 2019-05-15 NOTE — TELEPHONE ENCOUNTER
Requested Prescriptions     Pending Prescriptions Disp Refills    atorvastatin (LIPITOR) 10 mg tablet 90 Tab 1     Sig: Take 1 Tab by mouth daily.      DLS; 69/31/37   Next appointment: 10/24/19

## 2019-05-16 ENCOUNTER — TELEPHONE (OUTPATIENT)
Dept: INTERNAL MEDICINE CLINIC | Age: 64
End: 2019-05-16

## 2019-05-16 NOTE — TELEPHONE ENCOUNTER
Called, spoke to pt. Two identifiers confirmed. Appointment scheduled for 5/21. Pt verbalized understanding of information discussed w/ no further questions at this time.

## 2019-05-16 NOTE — TELEPHONE ENCOUNTER
#[phone] pt was released from the hospital on 5-14-19 and needs a VERO appt. He would like to come on Monday, 5-20-19 to be sure he is ok to go back to work. He had pneumonia.   Please call pt

## 2019-05-21 ENCOUNTER — OFFICE VISIT (OUTPATIENT)
Dept: INTERNAL MEDICINE CLINIC | Age: 64
End: 2019-05-21

## 2019-05-21 VITALS
DIASTOLIC BLOOD PRESSURE: 83 MMHG | BODY MASS INDEX: 34.07 KG/M2 | OXYGEN SATURATION: 96 % | SYSTOLIC BLOOD PRESSURE: 153 MMHG | WEIGHT: 238 LBS | RESPIRATION RATE: 16 BRPM | HEART RATE: 86 BPM | TEMPERATURE: 97.8 F | HEIGHT: 70 IN

## 2019-05-21 DIAGNOSIS — J18.9 COMMUNITY ACQUIRED PNEUMONIA OF LEFT LUNG, UNSPECIFIED PART OF LUNG: Primary | ICD-10-CM

## 2019-05-21 RX ORDER — BUDESONIDE AND FORMOTEROL FUMARATE DIHYDRATE 160; 4.5 UG/1; UG/1
2 AEROSOL RESPIRATORY (INHALATION) 2 TIMES DAILY
COMMUNITY
End: 2020-10-28 | Stop reason: ALTCHOICE

## 2019-05-21 NOTE — PROGRESS NOTES
HISTORY OF PRESENT ILLNESS Gertrude Dee is a 70 y.o. male. HPI  
 
{Choose one or more HPI Chronic Disease Notes, press DELETE if none desired:17405} {Choose one or more SmartLinks; press DELETE if none desired:[id number]} {Choose one or more Last Lab values; press DELETE if none desired:1422137} ROS Physical Exam 
 
ASSESSMENT and PLAN 
{ASSESSMENT/PLAN:49481}

## 2019-05-21 NOTE — LETTER
NOTIFICATION RETURN TO WORK / SCHOOL 
 
5/21/2019 3:43 PM 
 
[address] [place] [address] To Whom It May Concern: 
 
Jose M Rdz. is currently under the care of [facility]. He will return to work/school on: 5-28-19 Doctor's visit on 5-21-19. Unable to work 5/9/19 through 5/27/19 If there are questions or concerns please have the patient contact our office.  
 
 
 
Sincerely, 
 
 
Ankush Aguiar MD

## 2019-05-21 NOTE — PROGRESS NOTES
Chief Complaint   Patient presents with   Major Hospital Follow Up     pneumonia     Fatigue     from pneumonia    Letter for School/Work     Not ready to go back to work    Medication Refill     Lipitor 90 day supply to [address]

## 2019-05-21 NOTE — PROGRESS NOTES
HISTORY OF PRESENT ILLNESS  Judge Landry is a 70 y.o. male. HPI   Here in f/u hospitalization at [facility]  Had colonoscopy-5 polyps Dr Deborah Reese but was kept in hospital for pneumonia with hypoxia  Had left Pneumonia but cxr prior to discharge to home was clear per pt  Had chest CT too--no records  Complete moxifloxacin  Still feels fatigued and requests to return to work on 5-28-19    Patient Active Problem List    Diagnosis Date Noted    Pure hypercholesterolemia 10/11/2018    Situational stress 12/15/2017    Essential hypertension 01/26/2017    Moderate persistent asthma without complication 46/44/0135    Sepsis ([facility] [address] 04/29/2016    Pneumonia 04/29/2016    Right knee DJD     Nephrolithiasis     Colon polyp     Ventral hernia 12/05/2012    Seasonal allergic rhinitis 10/03/2012    Male hypogonadism 11/11/2011    ED (erectile dysfunction)      Current Outpatient Medications   Medication Sig Dispense Refill    budesonide-formoterol (SYMBICORT) 160-4.5 mcg/actuation HFAA Take 2 Puffs by inhalation two (2) times a day.  atorvastatin (LIPITOR) 10 mg tablet Take 1 Tab by mouth daily. 90 Tab 1    olmesartan-hydroCHLOROthiazide (BENICAR HCT) 20-12.5 mg per tablet Take 1 Tab by mouth daily. 90 Tab 3    TURMERIC, BULK, by Does Not Apply route.  B.infantis-B.ani-B.long-B.bifi (PROBIOTIC 4X) 10-15 mg TbEC Take  by mouth.  clomiPHENE (CLOMID) 50 mg tablet Take 50 mg by mouth daily.  folic acid (FOLVITE) 1 mg tablet Take  by mouth daily.  fluticasone (FLONASE) 50 mcg/actuation nasal spray USE 2 SPRAYS IN EACH NOSTRIL DAILY 3 Bottle 3    montelukast (SINGULAIR) 10 mg tablet TAKE 1 TABLET BY MOUTH DAILY 90 Tab 3    diclofenac EC (VOLTAREN) 50 mg EC tablet Take 50 mg by mouth.  testosterone (ANDROGEL) 20.25 mg/1.25 gram (1.62 %) gel Apply 20.25 mg to affected area daily.  Max Daily Amount: 20.25 mg. 3 Bottle 1    albuterol (VENTOLIN HFA) 90 mcg/actuation inhaler Take 2 puffs by inhalation every four (4) hours as needed for Wheezing. 1 Inhaler 5    multivitamin, stress formula (STRESS TAB) tablet Take 1 Tab by mouth daily.  cephALEXin (KEFLEX) 500 mg capsule Take 1 Cap by mouth four (4) times daily. 28 Cap 0    mometasone-formoterol (DULERA) 200-5 mcg/actuation HFA inhaler Take 2 Puffs by inhalation two (2) times a day. Allergies   Allergen Reactions    Iodine Hives     IVP contrast/seafood      Lab Results   Component Value Date/Time    WBC 4.4 08/01/2018 08:05 AM    HGB 14.3 08/01/2018 08:05 AM    HCT 42.2 08/01/2018 08:05 AM    PLATELET 836 54/03/8830 08:05 AM    MCV 89 08/01/2018 08:05 AM     Lab Results   Component Value Date/Time    GFR est non-AA 82 02/15/2019 09:45 AM    GFR est AA 94 02/15/2019 09:45 AM    Creatinine 0.97 02/15/2019 09:45 AM    BUN 16 02/15/2019 09:45 AM    Sodium 142 02/15/2019 09:45 AM    Potassium 4.3 02/15/2019 09:45 AM    Chloride 102 02/15/2019 09:45 AM    CO2 23 02/15/2019 09:45 AM        ROS    Physical Exam   Constitutional: He appears well-developed and well-nourished. No distress. Appears stated age   HENT:   Head: Normocephalic. Cardiovascular: Normal rate, regular rhythm and normal heart sounds. Exam reveals no gallop and no friction rub. No murmur heard. Pulmonary/Chest: Effort normal and breath sounds normal. No respiratory distress. He has no wheezes. He has no rales. He exhibits no tenderness. Abdominal: Soft. Musculoskeletal: He exhibits no edema. Neurological: He is alert. Psychiatric: He has a normal mood and affect. Nursing note and vitals reviewed. ASSESSMENT and PLAN  Diagnoses and all orders for this visit:    1. Community acquired pneumonia of left lung, unspecified part of lung   Clinically improving except fatigue   02 sat96% RA   Return to work letter was given to pt   To call or return if not fully improved    2.  Colon polyps   Will need repeat colonoscopy in 3-5 yrs -Dr Rodney Daniel and Dispositions    · Return in about 5 months (around 10/21/2019) for routine f/u.

## 2019-06-20 ENCOUNTER — TELEPHONE (OUTPATIENT)
Dept: INTERNAL MEDICINE CLINIC | Age: 64
End: 2019-06-20

## 2019-06-25 ENCOUNTER — HOSPITAL ENCOUNTER (EMERGENCY)
Age: 64
Discharge: HOME | End: 2019-06-26
Attending: EMERGENCY MEDICINE | Admitting: EMERGENCY MEDICINE
Payer: COMMERCIAL

## 2019-06-25 ENCOUNTER — APPOINTMENT (OUTPATIENT)
Dept: GENERAL RADIOLOGY | Age: 64
End: 2019-06-25
Attending: EMERGENCY MEDICINE
Payer: COMMERCIAL

## 2019-06-25 DIAGNOSIS — J45.41 MODERATE PERSISTENT ASTHMA WITH ACUTE EXACERBATION: Primary | ICD-10-CM

## 2019-06-25 LAB
ALBUMIN SERPL-MCNC: 3 G/DL (ref 3.5–5)
ALBUMIN/GLOB SERPL: 0.6 {RATIO} (ref 1.1–2.2)
ALP SERPL-CCNC: 81 U/L (ref 45–117)
ALT SERPL-CCNC: 21 U/L (ref 12–78)
ANION GAP SERPL CALC-SCNC: 7 MMOL/L (ref 5–15)
AST SERPL-CCNC: 21 U/L (ref 15–37)
BASOPHILS # BLD: 0 K/UL (ref 0–0.1)
BASOPHILS NFR BLD: 0 % (ref 0–1)
BILIRUB SERPL-MCNC: 0.2 MG/DL (ref 0.2–1)
BNP SERPL-MCNC: 61 PG/ML
BUN SERPL-MCNC: 24 MG/DL (ref 6–20)
BUN/CREAT SERPL: 20 (ref 12–20)
CALCIUM SERPL-MCNC: 9.3 MG/DL (ref 8.5–10.1)
CHLORIDE SERPL-SCNC: 105 MMOL/L (ref 97–108)
CO2 SERPL-SCNC: 27 MMOL/L (ref 21–32)
COMMENT, HOLDF: NORMAL
CREAT SERPL-MCNC: 1.21 MG/DL (ref 0.7–1.3)
DIFFERENTIAL METHOD BLD: ABNORMAL
EOSINOPHIL # BLD: 0.2 K/UL (ref 0–0.4)
EOSINOPHIL NFR BLD: 2 % (ref 0–7)
ERYTHROCYTE [DISTWIDTH] IN BLOOD BY AUTOMATED COUNT: 15.3 % (ref 11.5–14.5)
GLOBULIN SER CALC-MCNC: 5.4 G/DL (ref 2–4)
GLUCOSE SERPL-MCNC: 86 MG/DL (ref 65–100)
HCT VFR BLD AUTO: 41.9 % (ref 36.6–50.3)
HGB BLD-MCNC: 13.3 G/DL (ref 12.1–17)
IMM GRANULOCYTES # BLD AUTO: 0 K/UL (ref 0–0.04)
IMM GRANULOCYTES NFR BLD AUTO: 0 % (ref 0–0.5)
LYMPHOCYTES # BLD: 2.5 K/UL (ref 0.8–3.5)
LYMPHOCYTES NFR BLD: 33 % (ref 12–49)
MCH RBC QN AUTO: 28.5 PG (ref 26–34)
MCHC RBC AUTO-ENTMCNC: 31.7 G/DL (ref 30–36.5)
MCV RBC AUTO: 89.7 FL (ref 80–99)
MONOCYTES # BLD: 0.8 K/UL (ref 0–1)
MONOCYTES NFR BLD: 11 % (ref 5–13)
NEUTS SEG # BLD: 4.2 K/UL (ref 1.8–8)
NEUTS SEG NFR BLD: 54 % (ref 32–75)
NRBC # BLD: 0 K/UL (ref 0–0.01)
NRBC BLD-RTO: 0 PER 100 WBC
PLATELET # BLD AUTO: 329 K/UL (ref 150–400)
PMV BLD AUTO: 9.9 FL (ref 8.9–12.9)
POTASSIUM SERPL-SCNC: 4 MMOL/L (ref 3.5–5.1)
PROT SERPL-MCNC: 8.4 G/DL (ref 6.4–8.2)
RBC # BLD AUTO: 4.67 M/UL (ref 4.1–5.7)
SAMPLES BEING HELD,HOLD: NORMAL
SODIUM SERPL-SCNC: 139 MMOL/L (ref 136–145)
TROPONIN I SERPL-MCNC: <0.05 NG/ML
WBC # BLD AUTO: 7.8 K/UL (ref 4.1–11.1)

## 2019-06-25 PROCEDURE — 94640 AIRWAY INHALATION TREATMENT: CPT

## 2019-06-25 PROCEDURE — 85025 COMPLETE CBC W/AUTO DIFF WBC: CPT

## 2019-06-25 PROCEDURE — 71046 X-RAY EXAM CHEST 2 VIEWS: CPT

## 2019-06-25 PROCEDURE — 74011000250 HC RX REV CODE- 250: Performed by: EMERGENCY MEDICINE

## 2019-06-25 PROCEDURE — 83880 ASSAY OF NATRIURETIC PEPTIDE: CPT

## 2019-06-25 PROCEDURE — 77030029684 HC NEB SM VOL KT MONA -A

## 2019-06-25 PROCEDURE — 99283 EMERGENCY DEPT VISIT LOW MDM: CPT

## 2019-06-25 PROCEDURE — 74011000250 HC RX REV CODE- 250: Performed by: PHYSICIAN ASSISTANT

## 2019-06-25 PROCEDURE — 84484 ASSAY OF TROPONIN QUANT: CPT

## 2019-06-25 PROCEDURE — 93005 ELECTROCARDIOGRAM TRACING: CPT

## 2019-06-25 PROCEDURE — 96374 THER/PROPH/DIAG INJ IV PUSH: CPT

## 2019-06-25 PROCEDURE — 80053 COMPREHEN METABOLIC PANEL: CPT

## 2019-06-25 PROCEDURE — 74011250636 HC RX REV CODE- 250/636: Performed by: PHYSICIAN ASSISTANT

## 2019-06-25 RX ADMIN — ALBUTEROL SULFATE 1 DOSE: 2.5 SOLUTION RESPIRATORY (INHALATION) at 22:38

## 2019-06-25 RX ADMIN — METHYLPREDNISOLONE SODIUM SUCCINATE 40 MG: 40 INJECTION, POWDER, FOR SOLUTION INTRAMUSCULAR; INTRAVENOUS at 22:34

## 2019-06-25 RX ADMIN — ALBUTEROL SULFATE 1 DOSE: 2.5 SOLUTION RESPIRATORY (INHALATION) at 23:44

## 2019-06-25 NOTE — LETTER
[address] 
[address] [id number] 
[phone] Work/School Note Date: 6/25/2019 To Whom It May concern: 
 
Tashi Guevara. was seen and treated today in the emergency room by the following provider(s): 
Attending Provider: Kvng Cutler MD 
Physician Assistant: Bassem Valles. Tashi Guevara May return tor work on Friday July 5, 2019 Sincerely, Alicia SÁNCHEZ [facility]

## 2019-06-26 VITALS
DIASTOLIC BLOOD PRESSURE: 80 MMHG | HEART RATE: 92 BPM | OXYGEN SATURATION: 96 % | SYSTOLIC BLOOD PRESSURE: 135 MMHG | RESPIRATION RATE: 15 BRPM

## 2019-06-26 LAB
ATRIAL RATE: 79 BPM
CALCULATED P AXIS, ECG09: 55 DEGREES
CALCULATED R AXIS, ECG10: 36 DEGREES
CALCULATED T AXIS, ECG11: 22 DEGREES
DIAGNOSIS, 93000: NORMAL
P-R INTERVAL, ECG05: 174 MS
Q-T INTERVAL, ECG07: 372 MS
QRS DURATION, ECG06: 102 MS
QTC CALCULATION (BEZET), ECG08: 426 MS
VENTRICULAR RATE, ECG03: 79 BPM

## 2019-06-26 RX ORDER — PREDNISONE 10 MG/1
TABLET ORAL
Qty: 21 TAB | Refills: 0 | Status: SHIPPED | OUTPATIENT
Start: 2019-06-26 | End: 2019-06-26

## 2019-06-26 RX ORDER — PREDNISONE 10 MG/1
TABLET ORAL
Qty: 21 TAB | Refills: 0 | Status: SHIPPED | OUTPATIENT
Start: 2019-06-26 | End: 2019-10-24 | Stop reason: ALTCHOICE

## 2019-06-26 NOTE — ED TRIAGE NOTES
Patient arrives to the with c/o SOB 3 days, seen at [facility] on Sunday, mild distress noted, patient able to speak in complete sentences. States although seen at [facility], states he is feeling worse.

## 2019-06-26 NOTE — DISCHARGE INSTRUCTIONS

## 2019-06-26 NOTE — ED NOTES
Pt able to ambulate without any difficulty.  Pt O2 saturation did not drop below 94%, and he states he feels a lot better than when he first came in

## 2019-06-26 NOTE — ED PROVIDER NOTES
73-year-old  male with medical history significant for asthma, alcohol dependence, hypertension, gastroesophageal reflux disease and irritable bowel syndrome presenting ambulatory to the emergency department with complaint of continued shortness of breath, associated wheezing, exacerbated with activity and talking over the past 4-6 weeks. Patient reports being admitted at an outside hospital with pneumonia and was prescribed antibiotics which he has completed. He has followed up with his primary care doctor and was given permission to return to work. He states he attempted to return to work but he did feel significant shortness of breath and wheezing. He describes symptoms as typical of asthma but minimal improvement is gained with albuterol MDI. He also reports continued cough. He has noticed some sinus pressure and congestion. He denies any fever, ear pain, chest pain, abdominal pain, nausea, vomiting, diarrhea, calf pain, or urinary changes. Shortness of Breath   Associated symptoms include cough. Pertinent negatives include no fever, no headaches, no rhinorrhea, no sore throat, no ear pain, no chest pain, no vomiting and no abdominal pain.         Past Medical History:   Diagnosis Date    Alcohol dependence ([facility]     Asthma     X 32, seasonal     Colon polyp     ED (erectile dysfunction)     GERD (gastroesophageal reflux disease)     after meals only, diet controlled no meds    Hypertension     IBS (irritable bowel syndrome)     Nephrolithiasis     Positive PPD     Right knee DJD     Seasonal allergic rhinitis     Ventral hernia 12/5/2012       Past Surgical History:   Procedure Laterality Date    HX COLONOSCOPY  2014    due 2019    HX VASECTOMY      [place] PROSTATE BIOPSY, NEEDLE, SATURATION SAMPLING  2012    prostate bx     PROSTHESIS, PENILE, INFLATAB           Family History:   Problem Relation Age of Onset    Alcohol abuse Father     Diabetes Father     Cancer Father larynx    Hypertension Mother     Alcohol abuse Paternal Grandmother     Diabetes Paternal Grandmother        Social History     Socioeconomic History    Marital status:      Spouse name: Not on file    Number of children: Not on file    Years of education: Not on file    Highest education level: Not on file   Occupational History    Not on file   Social Needs    Financial resource strain: Not on file    Food insecurity:     Worry: Not on file     Inability: Not on file    Transportation needs:     Medical: Not on file     Non-medical: Not on file   Tobacco Use    Smoking status: Former Smoker     Packs/day: 1.00     Years: 12.00     Pack years: 12.00     Last attempt to quit: 1981     Years since quittin.8    Smokeless tobacco: Never Used   Substance and Sexual Activity    Alcohol use: Yes     Frequency: 2-4 times a month     Drinks per session: 1 or 2     Comment: 6 glasses per month    Drug use: Yes     Types: Prescription, OTC    Sexual activity: Yes     Partners: Female   Lifestyle    Physical activity:     Days per week: Not on file     Minutes per session: Not on file    Stress: Not on file   Relationships    Social connections:     Talks on phone: Not on file     Gets together: Not on file     Attends Sabianism service: Not on file     Active member of club or organization: Not on file     Attends meetings of clubs or organizations: Not on file     Relationship status: Not on file    Intimate partner violence:     Fear of current or ex partner: Not on file     Emotionally abused: Not on file     Physically abused: Not on file     Forced sexual activity: Not on file   Other Topics Concern    Not on file   Social History Narrative    Not on file         ALLERGIES: Iodine    Review of Systems   Constitutional: Negative. Negative for chills and fever. HENT: Positive for congestion, sinus pressure, sinus pain and sneezing.  Negative for ear pain, rhinorrhea, sore throat and voice change. Eyes: Negative. Respiratory: Positive for cough, chest tightness and shortness of breath. Cardiovascular: Negative for chest pain. Gastrointestinal: Negative for abdominal pain, constipation, diarrhea and vomiting. Genitourinary: Negative for difficulty urinating, dysuria, frequency and urgency. Musculoskeletal: Negative for joint swelling. Neurological: Negative for weakness, numbness and headaches. All other systems reviewed and are negative. Vitals:    06/25/19 2100 06/25/19 2242 06/25/19 2245 06/26/19 0000   BP:   (!) 137/91 135/80   Pulse: 89  83 92   Resp:   19 15   SpO2: 95% 96% 94% 96%            Physical Exam   Constitutional: He is oriented to person, place, and time. He appears well-developed and well-nourished. No distress. Well appearing AAM in NAD   HENT:   Head: Normocephalic and atraumatic. Right Ear: External ear normal.   Left Ear: External ear normal.   Nose: Nose normal.   Mouth/Throat: Oropharynx is clear and moist. No oropharyngeal exudate. Eyes: Pupils are equal, round, and reactive to light. Conjunctivae and EOM are normal. Right eye exhibits no discharge. Left eye exhibits no discharge. Neck: Normal range of motion. Neck supple. Cardiovascular: Normal rate, regular rhythm and normal heart sounds. Pulmonary/Chest: Effort normal. He has wheezes. He has no rales. Abdominal: Soft. Bowel sounds are normal. He exhibits no distension. There is no tenderness. There is no guarding. Musculoskeletal: Normal range of motion. Lymphadenopathy:     He has no cervical adenopathy. Neurological: He is alert and oriented to person, place, and time. No cranial nerve deficit. Skin: Skin is warm and dry. He is not diaphoretic. Psychiatric: He has a normal mood and affect. His behavior is normal.   Nursing note and vitals reviewed.        MDM  Number of Diagnoses or Management Options  Moderate persistent asthma with acute exacerbation:   Diagnosis management comments: 47-year-old male with complaint of continued shortness of breath or wheezing described as typical of asthma. He is currently well appearing without any significant distress on exam. His wheezing noted on exam. Is not hypoxic or tachypneic. Suspect continued asthma exacerbation. Has risk factors for ACS. Plan  EKG  Trop  CBC  CMP  BNP  Xray chest   duoneb  Solumedrol  Reassess. Bassem Bain         Amount and/or Complexity of Data Reviewed  Clinical lab tests: ordered and reviewed  Tests in the radiology section of CPT®: ordered and reviewed  Independent visualization of images, tracings, or specimens: yes           Procedures      Progress note    EKG interpretation:   Rhythm: normal sinus rhythm; and regular . Rate (approx.): 79; Axis: normal; P wave: normal; QRS interval: normal ; ST/T wave: normal; in  Leads. Bassem Bain    Trop -. Chest xray clear. Pt feeling much better after nebs. Ambulatory through ED without hypoxia or distress. Lul Bainma  Pt has appt with pulmonology next week. Will treat for acute asthma exacerbation. Bassem Bain    Patient's results have been reviewed with them. Patient and/or family have verbally conveyed their understanding and agreement of the patient's signs, symptoms, diagnosis, treatment and prognosis and additionally agree to follow up as recommended or return to the Emergency Room should their condition change prior to follow-up. Discharge instructions have also been provided to the patient with some educational information regarding their diagnosis as well a list of reasons why they would want to return to the ER prior to their follow-up appointment should their condition change.  Bassem Scuhltz

## 2019-07-05 ENCOUNTER — TELEPHONE (OUTPATIENT)
Dept: INTERNAL MEDICINE CLINIC | Age: 64
End: 2019-07-05

## 2019-07-05 RX ORDER — MONTELUKAST SODIUM 10 MG/1
TABLET ORAL
Qty: 90 TAB | Refills: 3 | Status: SHIPPED | OUTPATIENT
Start: 2019-07-05 | End: 2019-07-05 | Stop reason: SDUPTHER

## 2019-07-05 RX ORDER — ATORVASTATIN CALCIUM 10 MG/1
10 TABLET, FILM COATED ORAL DAILY
Qty: 90 TAB | Refills: 1 | Status: SHIPPED | OUTPATIENT
Start: 2019-07-05 | End: 2019-11-08 | Stop reason: SDUPTHER

## 2019-07-05 RX ORDER — FLUTICASONE PROPIONATE 50 MCG
SPRAY, SUSPENSION (ML) NASAL
Qty: 48 G | Refills: 3 | Status: SHIPPED | OUTPATIENT
Start: 2019-07-05 | End: 2019-10-24 | Stop reason: SDUPTHER

## 2019-07-05 RX ORDER — MONTELUKAST SODIUM 10 MG/1
TABLET ORAL
Qty: 90 TAB | Refills: 3 | Status: SHIPPED | OUTPATIENT
Start: 2019-07-05

## 2019-07-05 RX ORDER — FLUTICASONE PROPIONATE 50 MCG
SPRAY, SUSPENSION (ML) NASAL
Qty: 3 BOTTLE | Refills: 3 | Status: SHIPPED | OUTPATIENT
Start: 2019-07-05

## 2019-10-23 PROBLEM — G47.33 OSA (OBSTRUCTIVE SLEEP APNEA): Status: ACTIVE | Noted: 2019-10-23

## 2019-10-24 ENCOUNTER — OFFICE VISIT (OUTPATIENT)
Dept: INTERNAL MEDICINE CLINIC | Age: 64
End: 2019-10-24

## 2019-10-24 VITALS
DIASTOLIC BLOOD PRESSURE: 79 MMHG | SYSTOLIC BLOOD PRESSURE: 135 MMHG | TEMPERATURE: 97.6 F | HEIGHT: 71 IN | WEIGHT: 253 LBS | RESPIRATION RATE: 16 BRPM | OXYGEN SATURATION: 96 % | HEART RATE: 81 BPM | BODY MASS INDEX: 35.42 KG/M2

## 2019-10-24 DIAGNOSIS — Z12.5 PROSTATE CANCER SCREENING: Primary | ICD-10-CM

## 2019-10-24 DIAGNOSIS — E78.00 PURE HYPERCHOLESTEROLEMIA: ICD-10-CM

## 2019-10-24 DIAGNOSIS — I10 ESSENTIAL HYPERTENSION: ICD-10-CM

## 2019-10-24 DIAGNOSIS — G47.33 OSA (OBSTRUCTIVE SLEEP APNEA): ICD-10-CM

## 2019-10-24 RX ORDER — FLUTICASONE FUROATE AND VILANTEROL 200; 25 UG/1; UG/1
1 POWDER RESPIRATORY (INHALATION) DAILY
COMMUNITY

## 2019-10-24 NOTE — PATIENT INSTRUCTIONS
Office Policies Phone calls/patient messages: Please allow up to 24 hours for someone in the office to contact you about your call or message. Be mindful your provider may be out of the office or your message may require further review. We encourage you to use [employer] for your messages as this is a faster, more efficient way to communicate with our office Medication Refills: 
         
Prescription medications require 48-72 business hours to process. We encourage you to use [employer] for your refills. For controlled medications: Please allow 72 business hours to process. Certain medications may require you to  a written prescription at our office. NO narcotic/controlled medications will be prescribed after 4pm Monday through Friday or on weekends Form/Paperwork Completion: 
         
Please note a $25 fee may incur for all paperwork for completed by our providers. We ask that you allow 7-10 business days. Pre-payment is due prior to picking up/faxing the completed form. You may also download your forms to [employer] to have your doctor print off.

## 2019-10-25 LAB
ALBUMIN SERPL-MCNC: 4.1 G/DL (ref 3.6–4.8)
ALBUMIN/GLOB SERPL: 1.2 {RATIO} (ref 1.2–2.2)
ALP SERPL-CCNC: 83 IU/L (ref 39–117)
ALT SERPL-CCNC: 25 IU/L (ref 0–44)
AST SERPL-CCNC: 25 IU/L (ref 0–40)
BILIRUB SERPL-MCNC: 0.3 MG/DL (ref 0–1.2)
BUN SERPL-MCNC: 15 MG/DL (ref 8–27)
BUN/CREAT SERPL: 15 (ref 10–24)
CALCIUM SERPL-MCNC: 9.6 MG/DL (ref 8.6–10.2)
CHLORIDE SERPL-SCNC: 100 MMOL/L (ref 96–106)
CHOLEST SERPL-MCNC: 136 MG/DL (ref 100–199)
CO2 SERPL-SCNC: 25 MMOL/L (ref 20–29)
CREAT SERPL-MCNC: 1.01 MG/DL (ref 0.76–1.27)
GLOBULIN SER CALC-MCNC: 3.5 G/DL (ref 1.5–4.5)
GLUCOSE SERPL-MCNC: 92 MG/DL (ref 65–99)
HDLC SERPL-MCNC: 36 MG/DL
LDLC SERPL CALC-MCNC: 91 MG/DL (ref 0–99)
POTASSIUM SERPL-SCNC: 4.4 MMOL/L (ref 3.5–5.2)
PROT SERPL-MCNC: 7.6 G/DL (ref 6–8.5)
PSA SERPL-MCNC: 0.7 NG/ML (ref 0–4)
SODIUM SERPL-SCNC: 138 MMOL/L (ref 134–144)
TRIGL SERPL-MCNC: 44 MG/DL (ref 0–149)
VLDLC SERPL CALC-MCNC: 9 MG/DL (ref 5–40)

## 2019-11-08 RX ORDER — ATORVASTATIN CALCIUM 10 MG/1
10 TABLET, FILM COATED ORAL DAILY
Qty: 90 TAB | Refills: 3 | Status: SHIPPED | OUTPATIENT
Start: 2019-11-08 | End: 2020-04-14 | Stop reason: SDUPTHER

## 2019-11-08 NOTE — TELEPHONE ENCOUNTER
PCP: China Brown MD    Last appt: 10/24/2019  Future Appointments   Date Time Provider Laurence Mullen   4/28/2020  8:15 AM China Brown MD [facility]       Requested Prescriptions     Pending Prescriptions Disp Refills    atorvastatin (LIPITOR) 10 mg tablet 90 Tab 3     Sig: Take 1 Tab by mouth daily.

## 2020-01-15 RX ORDER — OLMESARTAN MEDOXOMIL AND HYDROCHLOROTHIAZIDE 20/12.5 20; 12.5 MG/1; MG/1
1 TABLET ORAL DAILY
Qty: 90 TAB | Refills: 3 | Status: SHIPPED | OUTPATIENT
Start: 2020-01-15 | End: 2021-03-10 | Stop reason: SDUPTHER

## 2020-01-15 NOTE — TELEPHONE ENCOUNTER
----- Message from Kunal Jesses sent at 1/15/2020 10:01 AM EST -----  Regarding: Dr. Betty Nation: [phone]  Caller (if not patient): pt  Relationship of caller (if not patient): n/a  Best contact number(s): [phone]   Name of medication and dosage if known: \"olmesartan-hctv tab 20mg-12.5mg\"  Is patient out of this medication (yes/no): No  Pharmacy name: [address] [place] [address] listed in chart? (yes/no):  No  Pharmacy phone number: [phone]   Date of last visit: 10/24/19   Details to clarify the request:         Copy/paste envera

## 2020-04-14 ENCOUNTER — TELEPHONE (OUTPATIENT)
Dept: INTERNAL MEDICINE CLINIC | Age: 65
End: 2020-04-14

## 2020-04-14 RX ORDER — ATORVASTATIN CALCIUM 10 MG/1
10 TABLET, FILM COATED ORAL DAILY
Qty: 90 TAB | Refills: 3 | Status: SHIPPED | OUTPATIENT
Start: 2020-04-14 | End: 2021-04-28 | Stop reason: SDUPTHER

## 2020-04-14 NOTE — TELEPHONE ENCOUNTER
Patient states he needs a call back to discuss his upcoming Virtual Visit with Dr. Leonardo Guardado on 4/28/20 & if labs need to be done should they be done Prior to Appt so the results can be discuss during VV. Please call.  Thank you

## 2020-04-15 NOTE — TELEPHONE ENCOUNTER
Christine Simons MD  You 16 hours ago (4:14 PM)     Tell pt I think it would be ok to do the labs later at next ov in 6 months sicne last labs were all wnl. If he would rather do the labs now then let me know    Routing comment      Notified pt via voicemail.

## 2020-04-27 NOTE — PROGRESS NOTES
HISTORY OF PRESENT ILLNESS  Donna Real Sr. is a 75 y.o. male. RICIH Darden. is a 75 y.o. male being evaluated by a Virtual Visit (video visit) encounter to address concerns as mentioned above. A caregiver was present when appropriate. Due to this being a TeleHealth encounter (During EBIUV-62 public health emergency), evaluation of the following organ systems was limited: Vitals/Constitutional/EENT/Resp/CV/GI//MS/Neuro/Skin/Heme-Lymph-Imm. Pursuant to the emergency declaration under the [facility], [address] [place] [address] authority and the [employer] and [employer] Act, this Virtual Visit was conducted with patient's (and/or legal guardian's) consent, to reduce the risk of exposure to COVID-19 and provide necessary medical care. Services were provided through a video synchronous discussion virtually to substitute for in-person encounter. --Teddy Turcios MD on 4/27/2020 at 4:21 PM    An electronic signature was used to authenticate this note.   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  Weight up 15-20 lbs over past year and eating more  Not exercising regularly  Tolerating lipitor  Will be seeing Dr Maxine Mancuso for shoulder pain      Patient Active Problem List    Diagnosis Date Noted    ROSENDO (obstructive sleep apnea) 10/23/2019    Pure hypercholesterolemia 10/11/2018    Situational stress 12/15/2017    Essential hypertension 01/26/2017    Moderate persistent asthma without complication 66/30/5616    Sepsis ([facility] [facility] 04/29/2016    Pneumonia 04/29/2016    Right knee DJD     Nephrolithiasis     Colon polyp     Ventral hernia 12/05/2012    Seasonal allergic rhinitis 10/03/2012    Male hypogonadism 11/11/2011    ED (erectile dysfunction)      Current Outpatient Medications   Medication Sig Dispense Refill    Flaxseed Oil oil by Does Not Apply route.  atorvastatin (LIPITOR) 10 mg tablet Take 1 Tab by mouth daily. 90 Tab 3    olmesartan-hydroCHLOROthiazide (BENICAR HCT) 20-12.5 mg per tablet Take 1 Tab by mouth daily. 90 Tab 3    fluticasone furoate-vilanterol (BREO ELLIPTA) 200-25 mcg/dose inhaler Take 1 Puff by inhalation daily.  omalizumab (XOLAIR SC) by SubCUTAneous route. 225 mg injection every 2 weeks      cpap machine kit by Does Not Apply route.  fluticasone propionate (FLONASE) 50 mcg/actuation nasal spray USE 2 SPRAYS IN EACH NOSTRIL DAILY 3 Bottle 3    montelukast (SINGULAIR) 10 mg tablet TAKE 1 TABLET BY MOUTH DAILY 90 Tab 3    B.infantis-B.ani-B.long-B.bifi (PROBIOTIC 4X) 10-15 mg TbEC Take  by mouth.  albuterol (VENTOLIN HFA) 90 mcg/actuation inhaler Take 2 puffs by inhalation every four (4) hours as needed for Wheezing. 1 Inhaler 5    multivitamin, stress formula (STRESS TAB) tablet Take 1 Tab by mouth daily.  budesonide-formoterol (SYMBICORT) 160-4.5 mcg/actuation HFAA Take 2 Puffs by inhalation two (2) times a day.  TURMERIC, BULK, by Does Not Apply route.  clomiPHENE (CLOMID) 50 mg tablet Take 50 mg by mouth daily.  folic acid (FOLVITE) 1 mg tablet Take  by mouth daily.  diclofenac EC (VOLTAREN) 50 mg EC tablet Take 50 mg by mouth.  mometasone-formoterol (DULERA) 200-5 mcg/actuation HFA inhaler Take 2 Puffs by inhalation two (2) times a day.  testosterone (ANDROGEL) 20.25 mg/1.25 gram (1.62 %) gel Apply 20.25 mg to affected area daily.  Max Daily Amount: 20.25 mg. 3 Bottle 1     Allergies   Allergen Reactions    Iodine Hives     IVP contrast/seafood      Lab Results   Component Value Date/Time    WBC 7.8 06/25/2019 09:20 PM    HGB 13.3 06/25/2019 09:20 PM    HCT 41.9 06/25/2019 09:20 PM    PLATELET 660 86/96/9088 09:20 PM    MCV 89.7 06/25/2019 09:20 PM     Lab Results   Component Value Date/Time    Hemoglobin A1c 5.8 (H) 11/11/2011 08:54 AM    Glucose 92 10/24/2019 08:48 AM    Microalb/Creat ratio (ug/mg creat.) 11.4 08/26/2014 09:25 AM    LDL, calculated 91 10/24/2019 08:48 AM    Creatinine 1.01 10/24/2019 08:48 AM      Lab Results   Component Value Date/Time    GFR est non-AA 78 10/24/2019 08:48 AM    GFR est AA 90 10/24/2019 08:48 AM    Creatinine 1.01 10/24/2019 08:48 AM    BUN 15 10/24/2019 08:48 AM    Sodium 138 10/24/2019 08:48 AM    Potassium 4.4 10/24/2019 08:48 AM    Chloride 100 10/24/2019 08:48 AM    CO2 25 10/24/2019 08:48 AM        ROS    Physical Exam  Constitutional:       Appearance: Normal appearance. He is obese. Neurological:      Mental Status: He is alert. ASSESSMENT and PLAN         This is the Subsequent Medicare Annual Wellness Exam, performed 12 months or more after the Initial AWV or the last Subsequent AWV    Consent: Xavier Bradshaw, who was seen by synchronous (real-time) audio-video technology, and/or his healthcare decision maker, is aware that this patient-initiated, Telehealth encounter on 4/28/2020 is a billable service. While AWVs are fully covered by Medicare, any services rendered on this date that are not included in an AWV are subject to additional billing, with coverage as determined by his insurance carrier. He is aware that he may receive a bill for any such additional services and has provided verbal consent to proceed: Yes. I have reviewed the patient's medical history in detail and updated the computerized patient record.      History     Patient Active Problem List   Diagnosis Code    ED (erectile dysfunction) N52.9    Male hypogonadism E29.1    Seasonal allergic rhinitis J30.2    Ventral hernia K43.9    Colon polyp K63.5    Right knee DJD M17.11    Nephrolithiasis N20.0    Sepsis ([address] A41.9    Pneumonia J18.9    Essential hypertension I10    Moderate persistent asthma without complication L25.40    Situational stress F43.9    Pure hypercholesterolemia E78.00    ROSENDO (obstructive sleep apnea) G47.33     Past Medical History:   Diagnosis Date    Alcohol dependence ([facility]     Asthma     X 27, seasonal     Colon polyp     ED (erectile dysfunction)     GERD (gastroesophageal reflux disease)     after meals only, diet controlled no meds    Hypertension     IBS (irritable bowel syndrome)     Nephrolithiasis     Positive PPD     Right knee DJD     Seasonal allergic rhinitis     Ventral hernia 12/5/2012      Past Surgical History:   Procedure Laterality Date    HX COLONOSCOPY  2014    due 2019    HX KNEE REPLACEMENT Left 2018    HX VASECTOMY      [place] PROSTATE BIOPSY, NEEDLE, SATURATION SAMPLING  2012    prostate bx     PROSTHESIS, PENILE, INFLATAB       Current Outpatient Medications   Medication Sig Dispense Refill    Flaxseed Oil oil by Does Not Apply route.  atorvastatin (LIPITOR) 10 mg tablet Take 1 Tab by mouth daily. 90 Tab 3    olmesartan-hydroCHLOROthiazide (BENICAR HCT) 20-12.5 mg per tablet Take 1 Tab by mouth daily. 90 Tab 3    fluticasone furoate-vilanterol (BREO ELLIPTA) 200-25 mcg/dose inhaler Take 1 Puff by inhalation daily.  omalizumab (XOLAIR SC) by SubCUTAneous route. 225 mg injection every 2 weeks      cpap machine kit by Does Not Apply route.  fluticasone propionate (FLONASE) 50 mcg/actuation nasal spray USE 2 SPRAYS IN EACH NOSTRIL DAILY 3 Bottle 3    montelukast (SINGULAIR) 10 mg tablet TAKE 1 TABLET BY MOUTH DAILY 90 Tab 3    B.infantis-B.ani-B.long-B.bifi (PROBIOTIC 4X) 10-15 mg TbEC Take  by mouth.  albuterol (VENTOLIN HFA) 90 mcg/actuation inhaler Take 2 puffs by inhalation every four (4) hours as needed for Wheezing. 1 Inhaler 5    multivitamin, stress formula (STRESS TAB) tablet Take 1 Tab by mouth daily.       budesonide-formoterol (SYMBICORT) 160-4.5 mcg/actuation HFAA Take 2 Puffs by inhalation two (2) times a day.  TURMERIC, BULK, by Does Not Apply route.  clomiPHENE (CLOMID) 50 mg tablet Take 50 mg by mouth daily.  folic acid (FOLVITE) 1 mg tablet Take  by mouth daily.  diclofenac EC (VOLTAREN) 50 mg EC tablet Take 50 mg by mouth.  mometasone-formoterol (DULERA) 200-5 mcg/actuation HFA inhaler Take 2 Puffs by inhalation two (2) times a day.  testosterone (ANDROGEL) 20.25 mg/1.25 gram (1.62 %) gel Apply 20.25 mg to affected area daily. Max Daily Amount: 20.25 mg. 3 Bottle 1     Allergies   Allergen Reactions    Iodine Hives     IVP contrast/seafood       Family History   Problem Relation Age of Onset    Alcohol abuse Father     Diabetes Father     Cancer Father         larynx    Hypertension Mother     Alcohol abuse Paternal Grandmother     Diabetes Paternal Grandmother      Social History     Tobacco Use    Smoking status: Former Smoker     Packs/day: 1.00     Years: 12.00     Pack years: 12.00     Last attempt to quit: 1981     Years since quittin.6    Smokeless tobacco: Never Used   Substance Use Topics    Alcohol use: Yes     Frequency: 2-4 times a month     Drinks per session: 1 or 2     Binge frequency: Never     Comment: 6 glasses per month       Depression Risk Factor Screening:     3 most recent PHQ Screens 2020   Little interest or pleasure in doing things Not at all   Feeling down, depressed, irritable, or hopeless Not at all   Total Score PHQ 2 0       Alcohol Risk Factor Screening (MALE > 65): Do you average more 1 drink per night or more than 7 drinks a week: No    In the past three months have you have had more than 4 drinks containing alcohol on one occasion: No      Functional Ability and Level of Safety:   Hearing: Hearing is good. Activities of Daily Living:   The home contains: handrails  Patient does total self care    Ambulation: with no difficulty    Fall Risk:  Fall Risk Assessment, last 12 mths 10/24/2019   Able to walk? Yes   Fall in past 12 months? No       Abuse Screen:  Patient is not abused    Cognitive Screening   Has your family/caregiver stated any concerns about your memory: no  Cognitive Screening: Normal - serial 3    Patient Care Team   Patient Care Team:  Shellie Caraballo MD as PCP - General (Internal Medicine)  Shellie Caraballo MD as PCP - [facility] Provider    Assessment/Plan   Education and counseling provided:  Are appropriate based on today's review and evaluation  End-of-Life planning (with patient's consent)    1. HTN     Continue benicar/hct   bp monitoring, low sodium diet    To call if > 140/90    2. HLD   LDl at goal on statin    3. Obesity   I have reviewed/discussed the above normal BMI with the patient. I have recommended the following interventions: dietary management education, guidance, and counseling . Dorian Murray Health Maintenance Due   Topic Date Due    Shingrix Vaccine Age 49> (1 of 2) 12/16/2003    GLAUCOMA SCREENING Q2Y  12/16/2018    AAA Screening 73-69 YO Male Smoking Patients  12/16/2018    Medicare Yearly Exam  04/27/2020         Alex Cash is a 75 y.o. male who was evaluated by a video visit encounter for concerns as above. Patient identification was verified prior to start of the visit. A caregiver was present when appropriate. Due to this being a TeleHealth encounter (During SZPII-70 public health emergency), evaluation of the following organ systems was limited: Vitals/Constitutional/EENT/Resp/CV/GI//MS/Neuro/Skin/Heme-Lymph-Imm. Pursuant to the emergency declaration under the [facility], [facility] waiver authority and the [employer] Act, this Virtual  Visit was conducted, with patient's (and/or legal guardian's) consent, to reduce the patient's risk of exposure to COVID-19 and provide necessary medical care.      Services were provided through a video synchronous discussion virtually to substitute for in-person clinic visit. Patient and provider were located at their individual homes.     Sachin Ruby MD

## 2020-04-28 ENCOUNTER — VIRTUAL VISIT (OUTPATIENT)
Dept: INTERNAL MEDICINE CLINIC | Age: 65
End: 2020-04-28

## 2020-04-28 VITALS — DIASTOLIC BLOOD PRESSURE: 88 MMHG | SYSTOLIC BLOOD PRESSURE: 156 MMHG

## 2020-04-28 DIAGNOSIS — E78.00 PURE HYPERCHOLESTEROLEMIA: ICD-10-CM

## 2020-04-28 DIAGNOSIS — I10 ESSENTIAL HYPERTENSION: Primary | ICD-10-CM

## 2020-04-28 DIAGNOSIS — J45.909 UNCOMPLICATED ASTHMA, UNSPECIFIED ASTHMA SEVERITY, UNSPECIFIED WHETHER PERSISTENT: ICD-10-CM

## 2020-04-28 NOTE — PATIENT INSTRUCTIONS
This is an established visit conducted via telemedicine. The patient has been instructed that this meets HIPAA criteria and acknowledges and agrees to this method of visitation. Julissaraj Gallagher, [place] 
33/54/70 
8:16 AM 
Chief Complaint Patient presents with  Cholesterol Problem 6 month follow up  Hypertension 6 month follow up  Labs Mail orders with map Medicare Wellness Visit, Male The best way to live healthy is to have a lifestyle where you eat a well-balanced diet, exercise regularly, limit alcohol use, and quit all forms of tobacco/nicotine, if applicable. Regular preventive services are another way to keep healthy. Preventive services (vaccines, screening tests, monitoring & exams) can help personalize your care plan, which helps you manage your own care. Screening tests can find health problems at the earliest stages, when they are easiest to treat. Itzeldeja follows the current, evidence-based guidelines published by the [facility] Ajay Seymour ([facility]) when recommending preventive services for our patients. Because we follow these guidelines, sometimes recommendations change over time as research supports it. (For example, a prostate screening blood test is no longer routinely recommended for men with no symptoms). Of course, you and your doctor may decide to screen more often for some diseases, based on your risk and co-morbidities (chronic disease you are already diagnosed with). Preventive services for you include: - Medicare offers their members a free annual wellness visit, which is time for you and your primary care provider to discuss and plan for your preventive service needs. Take advantage of this benefit every year! 
-All adults over age 72 should receive the recommended pneumonia vaccines. Current USPSTF guidelines recommend a series of two vaccines for the best pneumonia protection. -All adults should have a flu vaccine yearly and tetanus vaccine every 10 years. 
-All adults age 48 and older should receive the shingles vaccines (series of two vaccines). -All adults age 38-68 who are overweight should have a diabetes screening test once every three years.  
-Other screening tests & preventive services for persons with diabetes include: an eye exam to screen for diabetic retinopathy, a kidney function test, a foot exam, and stricter control over your cholesterol.  
-Cardiovascular screening for adults with routine risk involves an electrocardiogram (ECG) at intervals determined by the provider.  
-Colorectal cancer screening should be done for adults age 54-65 with no increased risk factors for colorectal cancer. There are a number of acceptable methods of screening for this type of cancer. Each test has its own benefits and drawbacks. Discuss with your provider what is most appropriate for you during your annual wellness visit. The different tests include: colonoscopy (considered the best screening method), a fecal occult blood test, a fecal DNA test, and sigmoidoscopy. 
-All adults born between [facility] should be screened once for Hepatitis C. 
-An Abdominal Aortic Aneurysm (AAA) Screening is recommended for men age 73-68 who has ever smoked in their lifetime. Here is a list of your current Health Maintenance items (your personalized list of preventive services) with a due date: 
Health Maintenance Due Topic Date Due  Shingles Vaccine (1 of 2) 12/16/2003  Glaucoma Screening   12/16/2018  AAA Screening  12/16/2018 Felisa Annual Well Visit  04/27/2020

## 2020-05-27 ENCOUNTER — VIRTUAL VISIT (OUTPATIENT)
Dept: INTERNAL MEDICINE CLINIC | Age: 65
End: 2020-05-27

## 2020-05-27 VITALS — TEMPERATURE: 97.7 F | DIASTOLIC BLOOD PRESSURE: 77 MMHG | HEART RATE: 84 BPM | SYSTOLIC BLOOD PRESSURE: 138 MMHG

## 2020-05-27 DIAGNOSIS — L03.116 CELLULITIS OF LEFT LEG: Primary | ICD-10-CM

## 2020-05-27 RX ORDER — CLINDAMYCIN HYDROCHLORIDE 300 MG/1
300 CAPSULE ORAL 3 TIMES DAILY
Qty: 30 CAP | Refills: 0 | Status: SHIPPED | OUTPATIENT
Start: 2020-05-27 | End: 2020-06-06

## 2020-05-27 NOTE — PATIENT INSTRUCTIONS
Office Policies Phone calls/patient messages: Please allow up to 24 hours for someone in the office to contact you about your call or message. Be mindful your provider may be out of the office or your message may require further review. We encourage you to use [employer] for your messages as this is a faster, more efficient way to communicate with our office Medication Refills: 
         
Prescription medications require 48-72 business hours to process. We encourage you to use [employer] for your refills. For controlled medications: Please allow 72 business hours to process. Certain medications may require you to  a written prescription at our office. NO narcotic/controlled medications will be prescribed after 4pm Monday through Friday or on weekends Form/Paperwork Completion: 
         
Please note a $25 fee may incur for all paperwork for completed by our providers. We ask that you allow 7-10 business days. Pre-payment is due prior to picking up/faxing the completed form. You may also download your forms to [employer] to have your doctor print off.

## 2020-05-27 NOTE — PROGRESS NOTES
Bess Lopez is a 75 y.o. male who was seen by synchronous (real-time) audio-video technology on 5/27/2020. Consent: Bess Lopez, who was seen by synchronous (real-time) audio-video technology, and/or his healthcare decision maker, is aware that this patient-initiated, Telehealth encounter on 5/27/2020 is a billable service, with coverage as determined by his insurance carrier. He is aware that he may receive a bill and has provided verbal consent to proceed: Yes. Assessment & Plan:   Diagnoses and all orders for this visit:    1. Cellulitis of left leg  -     clindamycin (CLEOCIN) 300 mg capsule; Take 1 Cap by mouth three (3) times daily for 10 days. Follow-up and Dispositions    · Return if symptoms worsen or fail to improve. Subjective:   Bess Lopez is a 75 y.o. male who was seen for Skin Problem (pt has bloches on back of L leg; pt states that he also had a knee replacement in that leg; pt does have itch and irritation to leg; pt has had this issue x 1 week; pt has no pain )    Chief Complaint   Patient presents with    Skin Problem     pt has bloches on back of L leg; pt states that he also had a knee replacement in that leg; pt does have itch and irritation to leg; pt has had this issue x 1 week; pt has no pain      He has redness, warmth, swelling of posterior L calf. Objective:   Vital Signs: (As obtained by patient/caregiver at home)  Visit Vitals  /77 (BP 1 Location: Left arm, BP Patient Position: Sitting)   Pulse 84   Temp 97.7 °F (36.5 °C) (Oral)        Examination of leg by camera phone reveals confluent area of bright erythema.      Constitutional: [x] Appears well-developed and well-nourished [x] No apparent distress      [] Abnormal -     Mental status: [x] Alert and awake  [x] Oriented to person/place/time [x] Able to follow commands    [] Abnormal -     Eyes:   EOM    [x]  Normal    [] Abnormal -   Sclera  [x]  Normal    [] Abnormal -          Discharge [x]  None visible   [] Abnormal -     HENT: [x] Normocephalic, atraumatic  [] Abnormal -   [x] Mouth/Throat: Mucous membranes are moist    External Ears [x] Normal  [] Abnormal -    Neck: [x] No visualized mass [] Abnormal -     Pulmonary/Chest: [x] Respiratory effort normal   [x] No visualized signs of difficulty breathing or respiratory distress        [] Abnormal -      Musculoskeletal:   [x] Normal gait with no signs of ataxia         [x] Normal range of motion of neck        [] Abnormal -     Neurological:        [x] No Facial Asymmetry (Cranial nerve 7 motor function) (limited exam due to video visit)          [x] No gaze palsy        [] Abnormal -          Skin:        [x] No significant exanthematous lesions or discoloration noted on facial skin         [] Abnormal -            Psychiatric:       [x] Normal Affect [] Abnormal -        [x] No Hallucinations    Other pertinent observable physical exam findings:-        We discussed the expected course, resolution and complications of the diagnosis(es) in detail. Medication risks, benefits, costs, interactions, and alternatives were discussed as indicated. I advised him to contact the office if his condition worsens, changes or fails to improve as anticipated. He expressed understanding with the diagnosis(es) and plan. Mercedez Manuel is a 75 y.o. male who was evaluated by a video visit encounter for concerns as above. Patient identification was verified prior to start of the visit. A caregiver was present when appropriate. Due to this being a TeleHealth encounter ([facility] public health [facility]), evaluation of the following organ systems was limited: Vitals/Constitutional/EENT/Resp/CV/GI//MS/Neuro/Skin/Heme-Lymph-Imm.   Pursuant to the emergency declaration under the [facility], [address] [place] [address] and the [employer] General Act, this Virtual  Visit was conducted, with patient's (and/or legal guardian's) consent, to reduce the patient's risk of exposure to COVID-19 and provide necessary medical care. Services were provided through a video synchronous discussion virtually to substitute for in-person clinic visit. Patient and provider were located at their individual homes.       Amyaa Celis MD

## 2020-10-27 NOTE — PROGRESS NOTES
HISTORY OF PRESENT ILLNESS  Johnny Renee is a 75 y.o. male. HPI   Johnny Renee is a 75 y.o. male being evaluated by a Virtual Visit (video visit) encounter to address concerns as mentioned above. A caregiver was present when appropriate. Due to this being a TeleHealth encounter (During JTOBO-76 public health emergency), evaluation of the following organ systems was limited: Vitals/Constitutional/EENT/Resp/CV/GI//MS/Neuro/Skin/Heme-Lymph-Imm. Pursuant to the emergency declaration under the [facility], [address] [place] [address] authority and the [employer] and [employer] Act, this Virtual Visit was conducted with patient's (and/or legal guardian's) consent, to reduce the risk of exposure to COVID-19 and provide necessary medical care. Services were provided through a video synchronous discussion virtually to substitute for in-person encounter. --Marcelle Bazan MD on 10/27/2020 at 5:18 PM    An electronic signature was used to authenticate this note. F/u htn hld asthma obesity, rosendo on cpap  Home BP -131/79 hr 67  Went to [facility] last week ofr conjunctivitis --bp 138/80  Weight up 20 lbs since May  Exercising 2-3 d per week on bike and golf weekly  Asthma sxs on breo---no flares and on xolair too.  Sees Allergy MD and sees pulm Dr Coco Sylvester last month---had KIMI  Last OV    Last LDL 91     Home /81 and 156/88  Weight --around 250lb     Had attended sleep study and cpap was adjusted  Asthma has been contlled on xolair    Patient Active Problem List    Diagnosis Date Noted    ROSENDO (obstructive sleep apnea) 10/23/2019    Pure hypercholesterolemia 10/11/2018    Situational stress 12/15/2017    Essential hypertension 01/26/2017    Moderate persistent asthma without complication 72/43/8035    Sepsis ([facility] [address] 04/29/2016    Pneumonia 04/29/2016    Right knee DJD     Nephrolithiasis     Colon polyp     Ventral hernia 12/05/2012  Seasonal allergic rhinitis 10/03/2012    Male hypogonadism 11/11/2011    ED (erectile dysfunction)      Current Outpatient Medications   Medication Sig Dispense Refill    Flaxseed Oil oil by Does Not Apply route.  atorvastatin (LIPITOR) 10 mg tablet Take 1 Tab by mouth daily. 90 Tab 3    olmesartan-hydroCHLOROthiazide (BENICAR HCT) 20-12.5 mg per tablet Take 1 Tab by mouth daily. 90 Tab 3    fluticasone furoate-vilanterol (BREO ELLIPTA) 200-25 mcg/dose inhaler Take 1 Puff by inhalation daily.  omalizumab (XOLAIR SC) by SubCUTAneous route. 225 mg injection every 2 weeks      cpap machine kit by Does Not Apply route.  fluticasone propionate (FLONASE) 50 mcg/actuation nasal spray USE 2 SPRAYS IN EACH NOSTRIL DAILY 3 Bottle 3    montelukast (SINGULAIR) 10 mg tablet TAKE 1 TABLET BY MOUTH DAILY 90 Tab 3    budesonide-formoterol (SYMBICORT) 160-4.5 mcg/actuation HFAA Take 2 Puffs by inhalation two (2) times a day.  TURMERIC, BULK, by Does Not Apply route.  B.infantis-B.ani-B.long-B.bifi (PROBIOTIC 4X) 10-15 mg TbEC Take  by mouth.  clomiPHENE (CLOMID) 50 mg tablet Take 50 mg by mouth daily.  folic acid (FOLVITE) 1 mg tablet Take  by mouth daily.  diclofenac EC (VOLTAREN) 50 mg EC tablet Take 50 mg by mouth.  mometasone-formoterol (DULERA) 200-5 mcg/actuation HFA inhaler Take 2 Puffs by inhalation two (2) times a day.  testosterone (ANDROGEL) 20.25 mg/1.25 gram (1.62 %) gel Apply 20.25 mg to affected area daily. Max Daily Amount: 20.25 mg. 3 Bottle 1    albuterol (VENTOLIN HFA) 90 mcg/actuation inhaler Take 2 puffs by inhalation every four (4) hours as needed for Wheezing. 1 Inhaler 5    multivitamin, stress formula (STRESS TAB) tablet Take 1 Tab by mouth daily.        Allergies   Allergen Reactions    Iodine Hives     IVP contrast/seafood      Lab Results   Component Value Date/Time    WBC 7.8 06/25/2019 09:20 PM    HGB 13.3 06/25/2019 09:20 PM    HCT 41.9 06/25/2019 09:20 PM    PLATELET 873 32/07/8845 09:20 PM    MCV 89.7 06/25/2019 09:20 PM     Lab Results   Component Value Date/Time    Hemoglobin A1c 5.8 (H) 11/11/2011 08:54 AM    Glucose 92 10/24/2019 08:48 AM    Microalb/Creat ratio (ug/mg creat.) 11.4 08/26/2014 09:25 AM    LDL, calculated 91 10/24/2019 08:48 AM    Creatinine 1.01 10/24/2019 08:48 AM      Lab Results   Component Value Date/Time    Cholesterol, total 136 10/24/2019 08:48 AM    HDL Cholesterol 36 (L) 10/24/2019 08:48 AM    LDL, calculated 91 10/24/2019 08:48 AM    Triglyceride 44 10/24/2019 08:48 AM    CHOL/HDL Ratio 4.2 08/14/2010 06:46 AM     Lab Results   Component Value Date/Time    GFR est non-AA 78 10/24/2019 08:48 AM    GFR est AA 90 10/24/2019 08:48 AM    Creatinine 1.01 10/24/2019 08:48 AM    BUN 15 10/24/2019 08:48 AM    Sodium 138 10/24/2019 08:48 AM    Potassium 4.4 10/24/2019 08:48 AM    Chloride 100 10/24/2019 08:48 AM    CO2 25 10/24/2019 08:48 AM        ROS    Physical Exam  Constitutional:       Appearance: Normal appearance. He is obese. Pulmonary:      Effort: Pulmonary effort is normal.   Neurological:      General: No focal deficit present. Mental Status: He is alert. ASSESSMENT and PLAN  Diagnoses and all orders for this visit:    1. Essential hypertension  -     CBC W/O DIFF  -     METABOLIC PANEL, COMPREHENSIVE  -     CBC W/O DIFF  -     METABOLIC PANEL, COMPREHENSIVE   controlled  2. Pure hypercholesterolemia  -     METABOLIC PANEL, COMPREHENSIVE  -     LIPID PANEL  -     METABOLIC PANEL, COMPREHENSIVE  -     LIPID PANEL   On statin  3. Uncomplicated asthma, unspecified asthma severity, unspecified whether persistent  -     METABOLIC PANEL, COMPREHENSIVE   Well controlled  4. Prostate cancer screening   Done per KATIE MALLORY  5. Obesity   I have reviewed/discussed the above normal BMI with the patient.   I have recommended the following interventions: dietary management education, guidance, and counseling and encourage exercise . [place]       rtc 6 months medicare wellness

## 2020-10-28 ENCOUNTER — VIRTUAL VISIT (OUTPATIENT)
Dept: INTERNAL MEDICINE CLINIC | Age: 65
End: 2020-10-28
Payer: MEDICARE

## 2020-10-28 DIAGNOSIS — J45.909 UNCOMPLICATED ASTHMA, UNSPECIFIED ASTHMA SEVERITY, UNSPECIFIED WHETHER PERSISTENT: ICD-10-CM

## 2020-10-28 DIAGNOSIS — E78.00 PURE HYPERCHOLESTEROLEMIA: ICD-10-CM

## 2020-10-28 DIAGNOSIS — Z12.5 PROSTATE CANCER SCREENING: ICD-10-CM

## 2020-10-28 DIAGNOSIS — I10 ESSENTIAL HYPERTENSION: Primary | ICD-10-CM

## 2020-10-28 PROCEDURE — 99214 OFFICE O/P EST MOD 30 MIN: CPT | Performed by: INTERNAL MEDICINE

## 2020-10-28 PROCEDURE — G0463 HOSPITAL OUTPT CLINIC VISIT: HCPCS | Performed by: INTERNAL MEDICINE

## 2020-10-28 PROCEDURE — G8756 NO BP MEASURE DOC: HCPCS | Performed by: INTERNAL MEDICINE

## 2020-10-28 PROCEDURE — 3017F COLORECTAL CA SCREEN DOC REV: CPT | Performed by: INTERNAL MEDICINE

## 2020-10-28 PROCEDURE — 1101F PT FALLS ASSESS-DOCD LE1/YR: CPT | Performed by: INTERNAL MEDICINE

## 2020-10-28 PROCEDURE — G8432 DEP SCR NOT DOC, RNG: HCPCS | Performed by: INTERNAL MEDICINE

## 2020-10-28 PROCEDURE — G8427 DOCREV CUR MEDS BY ELIG CLIN: HCPCS | Performed by: INTERNAL MEDICINE

## 2020-10-28 RX ORDER — CYANOCOBALAMIN 1000 UG/ML
1000 INJECTION, SOLUTION INTRAMUSCULAR; SUBCUTANEOUS ONCE
COMMUNITY

## 2020-10-28 RX ORDER — ASCORBIC ACID 500 MG
1000 TABLET ORAL
COMMUNITY

## 2020-10-28 RX ORDER — CHOLECALCIFEROL TAB 125 MCG (5000 UNIT) 125 MCG
5000 TAB ORAL 2 TIMES DAILY
COMMUNITY

## 2020-10-28 NOTE — PATIENT INSTRUCTIONS
This is an established visit conducted via telemedicine. The patient has been instructed that this meets HIPAA criteria and acknowledges and agrees to this method of visitation. Tanisha Elmore LPN 
36/79/85 
1:97 AM 
 
1. Have you been to the ER, urgent care clinic since your last visit? Hospitalized since your last visit? No 
 
2. Have you seen or consulted any other health care providers outside of the [address] [place] [address] since your last visit? Include any pap smears or colon screening.  No

## 2020-11-11 ENCOUNTER — HOSPITAL ENCOUNTER (OUTPATIENT)
Dept: LAB | Age: 65
Discharge: HOME | End: 2020-11-11
Payer: MEDICARE

## 2020-11-11 PROCEDURE — 80061 LIPID PANEL: CPT

## 2020-11-11 PROCEDURE — 85027 COMPLETE CBC AUTOMATED: CPT

## 2020-11-11 PROCEDURE — 80053 COMPREHEN METABOLIC PANEL: CPT

## 2020-11-11 PROCEDURE — 36415 COLL VENOUS BLD VENIPUNCTURE: CPT

## 2020-11-12 LAB
ALBUMIN SERPL-MCNC: 4.3 G/DL (ref 3.8–4.8)
ALBUMIN/GLOB SERPL: 1.1 {RATIO} (ref 1.2–2.2)
ALP SERPL-CCNC: 83 IU/L (ref 39–117)
ALT SERPL-CCNC: 20 IU/L (ref 0–44)
AST SERPL-CCNC: 19 IU/L (ref 0–40)
BILIRUB SERPL-MCNC: 0.3 MG/DL (ref 0–1.2)
BUN SERPL-MCNC: 17 MG/DL (ref 8–27)
BUN/CREAT SERPL: 17 (ref 10–24)
CALCIUM SERPL-MCNC: 9.4 MG/DL (ref 8.6–10.2)
CHLORIDE SERPL-SCNC: 105 MMOL/L (ref 96–106)
CHOLEST SERPL-MCNC: 147 MG/DL (ref 100–199)
CO2 SERPL-SCNC: 24 MMOL/L (ref 20–29)
CREAT SERPL-MCNC: 1.01 MG/DL (ref 0.76–1.27)
ERYTHROCYTE [DISTWIDTH] IN BLOOD BY AUTOMATED COUNT: 13.4 % (ref 11.6–15.4)
GLOBULIN SER CALC-MCNC: 3.8 G/DL (ref 1.5–4.5)
GLUCOSE SERPL-MCNC: 97 MG/DL (ref 65–99)
HCT VFR BLD AUTO: 42.3 % (ref 37.5–51)
HDLC SERPL-MCNC: 38 MG/DL
HGB BLD-MCNC: 14.2 G/DL (ref 13–17.7)
LDLC SERPL CALC-MCNC: 99 MG/DL (ref 0–99)
MCH RBC QN AUTO: 30 PG (ref 26.6–33)
MCHC RBC AUTO-ENTMCNC: 33.6 G/DL (ref 31.5–35.7)
MCV RBC AUTO: 89 FL (ref 79–97)
PLATELET # BLD AUTO: 263 X10E3/UL (ref 150–450)
POTASSIUM SERPL-SCNC: 4.3 MMOL/L (ref 3.5–5.2)
PROT SERPL-MCNC: 8.1 G/DL (ref 6–8.5)
RBC # BLD AUTO: 4.73 X10E6/UL (ref 4.14–5.8)
SODIUM SERPL-SCNC: 142 MMOL/L (ref 134–144)
TRIGL SERPL-MCNC: 46 MG/DL (ref 0–149)
VLDLC SERPL CALC-MCNC: 10 MG/DL (ref 5–40)
WBC # BLD AUTO: 6.5 X10E3/UL (ref 3.4–10.8)

## 2020-12-14 NOTE — PROGRESS NOTES
Cardiac Electrophysiology Outpatient Consult Note            [facility]    Consult Note     Prachi Bailey  [id number]  12/14/2020  [phone]     Primary Care Physician: Pat Fields MD    Referred By: Michael Cornejo MD    Subjective     Chief Complaint:  Chief Complaint   Patient presents with   • Syncope     Problem List:      1. Near syncope  a. Echo 12/7/2020 EF 61 to 65% without significant structural or functional valvular disease.  b. 14-day cardiac monitor placed 11/6/2020 HR 41-1 51, AVG 76 bpm with frequent PACs and SVE burden 6.5%, noted sinus tachycardia and nocturnal second-degree AV block.  2. Heart murmur  3. Asthma      History of Present Illness: Ms. Prachi Bailey is a 25-year-old white female seen in EP consultation today at the request of Dr. Cornejo for the evaluation of her recurrent dizziness with near syncope.  Upon evaluation today patient reports 3 distinct episodes of sudden onset dizziness with near syncope.  She reports that her first episode occurred several months after the birth of her first child approximately a year and a half ago.  She reports she was walking with her  with a sudden onset profound fatigue associated with dizziness and near syncope.  She reports the episode only lasted a few minutes and went away.  Patient reports not having recurrent symptoms until a few weeks ago while getting up to change her toddler's diaper in the middle of the night.  She reports awakening and changing the diaper and when a taking her toddler back to her crib she began to feel a very similar feeling of lightheadedness and dizziness associated with feeling very weak and fatigued.  She reports the episode only lasted a couple minutes and again went away on its own.  Patient reports one other similar episode occurring approximately 1 week after the second event.  Patient reports she wore a 2-week cardiac monitor recently and  HISTORY OF PRESENT ILLNESS  Noé Sanders is a 70 y.o. male. 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  Weight up 15-20 lbs over past year and eating more  Not exercising regularly  Tolerating lipitor  Will be seeing Dr Juanita Hassan for shoulder pain    Last OV  Here in f/u hospitalization at [facility]  Had colonoscopy-5 polyps Dr Trish Eddy but was kept in hospital for pneumonia with hypoxia  Had left Pneumonia but cxr prior to discharge to home was clear per pt  Had chest CT too--no records  Complete moxifloxacin  Still feels fatigued and requests to return to work on 5-28-19     Last OV    Last  so started on lipitor 10 mg every day  Tolerating lipitor     Pt wants to try another ARB than losartan due to recall of medication     Has rash on his nose --used paper towel last week to blow nose, next day noted redness and blisters. No pus. Not tender    Patient Active Problem List    Diagnosis Date Noted    ROSENDO (obstructive sleep apnea) 10/23/2019    Pure hypercholesterolemia 10/11/2018    Situational stress 12/15/2017    Essential hypertension 01/26/2017    Moderate persistent asthma without complication 44/36/1272    Sepsis ([facility] [address] 04/29/2016    Pneumonia 04/29/2016    Right knee DJD     Nephrolithiasis     Colon polyp     Ventral hernia 12/05/2012    Seasonal allergic rhinitis 10/03/2012    Male hypogonadism 11/11/2011    ED (erectile dysfunction)      Current Outpatient Medications   Medication Sig Dispense Refill    fluticasone furoate-vilanterol (BREO ELLIPTA) 200-25 mcg/dose inhaler Take 1 Puff by inhalation daily.  omalizumab (XOLAIR SC) by SubCUTAneous route. 225 mg injection every 2 weeks      cpap machine kit by Does Not Apply route.       fluticasone propionate (FLONASE) 50 mcg/actuation nasal spray USE 2 SPRAYS IN EACH NOSTRIL DAILY 3 Bottle 3    atorvastatin (LIPITOR) 10 mg tablet Take 1 Tab by mouth daily. 90 Tab 1    montelukast (SINGULAIR) 10 mg tablet TAKE 1 TABLET BY MOUTH DAILY 90 Tab 3    olmesartan-hydroCHLOROthiazide (BENICAR HCT) 20-12.5 mg per tablet Take 1 Tab by mouth daily. 90 Tab 3    TURMERIC, BULK, by Does Not Apply route.  B.infantis-B.ani-B.long-B.bifi (PROBIOTIC 4X) 10-15 mg TbEC Take  by mouth.  folic acid (FOLVITE) 1 mg tablet Take  by mouth daily.  testosterone (ANDROGEL) 20.25 mg/1.25 gram (1.62 %) gel Apply 20.25 mg to affected area daily. Max Daily Amount: 20.25 mg. 3 Bottle 1    albuterol (VENTOLIN HFA) 90 mcg/actuation inhaler Take 2 puffs by inhalation every four (4) hours as needed for Wheezing. 1 Inhaler 5    multivitamin, stress formula (STRESS TAB) tablet Take 1 Tab by mouth daily.  budesonide-formoterol (SYMBICORT) 160-4.5 mcg/actuation HFAA Take 2 Puffs by inhalation two (2) times a day.  clomiPHENE (CLOMID) 50 mg tablet Take 50 mg by mouth daily.  diclofenac EC (VOLTAREN) 50 mg EC tablet Take 50 mg by mouth.  mometasone-formoterol (DULERA) 200-5 mcg/actuation HFA inhaler Take 2 Puffs by inhalation two (2) times a day.        Allergies   Allergen Reactions    Iodine Hives     IVP contrast/seafood      Lab Results   Component Value Date/Time    Hemoglobin A1c 5.8 (H) 11/11/2011 08:54 AM    Glucose 86 06/25/2019 09:20 PM    Microalb/Creat ratio (ug/mg creat.) 11.4 08/26/2014 09:25 AM    LDL, calculated 100 (H) 04/24/2019 09:22 AM    Creatinine 1.21 06/25/2019 09:20 PM      Lab Results   Component Value Date/Time    Cholesterol, total 147 04/24/2019 09:22 AM    HDL Cholesterol 36 (L) 04/24/2019 09:22 AM    LDL, calculated 100 (H) 04/24/2019 09:22 AM    Triglyceride 53 04/24/2019 09:22 AM    CHOL/HDL Ratio 4.2 08/14/2010 06:46 AM     Lab Results   Component Value Date/Time    GFR est non-AA >60 06/25/2019 09:20 PM    GFR est AA >60 06/25/2019 09:20 PM    Creatinine 1.21 06/25/2019 09:20 only felt one brief episode of lightheadedness while seated at her desk at work that lasted a few seconds.  Patient denies chest pain, palpitations, syncope, or TIA/strokelike symptoms.  Patient denies history of thyroid disease or family history of sudden cardiac death.      Review of Systems:   Constitutional: No fevers or chills, no recent weight gain or weight loss, +fatigue  Eyes: No visual loss, blurred vision, double vision, yellow sclerae.  ENT: No headaches, hearing loss, vertigo, congestion or sore throat.   Cardiovascular: Per HPI  Respiratory: No cough or wheezing, no sputum production, no hematemesis   Gastrointestinal: No abdominal pain, no nausea, vomiting, constipation, diarrhea, melena.   Genitourinary: No dysuria, hematuria or increased frequency.  Musculoskeletal:  No gait disturbance, weakness or joint pain or stiffness  Integumentary: No rashes, urticaria, ulcers or sores.   Neurological: No headache, dizziness, syncope, paralysis, ataxia  Psychiatric: No anxiety, or depression  Endocrine: No diaphoresis, cold or heat intolerance. No polyuria or polydipsia.   Hematologic/Lymphatic: No anemia, abnormal bruising or bleeding.       Past Medical History:   Past Medical History:   Diagnosis Date   • Abnormal ECG 07/12/18   • Aortic insufficiency due to bicuspid aortic valve 7/19/2019   • Asthma    • Heart murmur 07/12/19   • Heart murmur        Past Surgical History: History reviewed. No pertinent surgical history.    Family History:   Family History   Problem Relation Age of Onset   • Asthma Father    • Hypertension Father    • Hyperlipidemia Father    • Heart attack Maternal Uncle    • Heart attack Paternal Grandfather    • Stroke Paternal Grandfather    • Hyperlipidemia Paternal Grandfather    • Hypertension Paternal Grandfather    • Asthma Sister        Social History:   Social History     Socioeconomic History   • Marital status:      Spouse name: Not on file   • Number of children: Not  "on file   • Years of education: Not on file   • Highest education level: Not on file   Tobacco Use   • Smoking status: Never Smoker   • Smokeless tobacco: Never Used   Substance and Sexual Activity   • Alcohol use: Yes     Alcohol/week: 1.0 standard drinks     Types: 1 Glasses of wine per week   • Drug use: No   • Sexual activity: Yes     Partners: Male     Birth control/protection: OCP       Medications:     Current Outpatient Medications:   •  albuterol sulfate  (90 Base) MCG/ACT inhaler, Inhale 2 puffs Every 4 (Four) Hours As Needed for Wheezing., Disp: , Rfl:   •  montelukast (SINGULAIR) 10 MG tablet, Take 1 tablet by mouth Every Night., Disp: 90 tablet, Rfl: 1  •  STAHIST AD 25-60 MG tablet, TK 1 T PO Q 12 HOURS PRN, Disp: , Rfl: 4  •  Symbicort 160-4.5 MCG/ACT inhaler, Inhale 2 puffs 2 (Two) Times a Day., Disp: 10.2 g, Rfl: 12  •  valACYclovir (VALTREX) 500 MG tablet, Take 500 mg by mouth Daily As Needed., Disp: , Rfl: 3    Allergies:   No Known Allergies    Objective     Physical Exam:  Vital Signs:   Vitals:    12/14/20 0829   BP: 110/72   BP Location: Left arm   Patient Position: Sitting   Pulse: 60   Temp: 97.8 °F (36.6 °C)   SpO2: 98%   Weight: 79.3 kg (174 lb 12.8 oz)   Height: 165.1 cm (65\")     GEN: Well nourished, well-developed, no acute distress  HEENT: Normocephalic, atraumatic, moist mucous membranes  NECK: Supple, no JVD, no thyromegaly, no lymphadenopathy  CARD: Regular rate and rhythm, normal S1 & S2 are present.  No murmur, gallop or rubs are appreciated.  LUNGS: Clear to auscultation bilateraly, normal respiratory effort  ABDOMEN: Soft, nontender, normal bowel sounds  EXTREMITIES: No gross deformities, no clubbing, cyanosis.  No Edema   SKIN: Warm, dry  NEURO: No focal deficits, alert and oriented x 3  PSYCHIATRIC: Normal affect and mood, appropriate use of semantics and logic.      Lab Results   Component Value Date    GLUCOSE 81 10/07/2020    CALCIUM 9.7 10/07/2020     " PM    BUN 24 (H) 06/25/2019 09:20 PM    Sodium 139 06/25/2019 09:20 PM    Potassium 4.0 06/25/2019 09:20 PM    Chloride 105 06/25/2019 09:20 PM    CO2 27 06/25/2019 09:20 PM        ROS    Physical Exam   Constitutional: He appears well-developed and well-nourished. No distress. Appears stated age   HENT:   Head: Normocephalic. Neck: Normal range of motion. Neck supple. Cardiovascular: Normal rate, regular rhythm and normal heart sounds. Exam reveals no gallop and no friction rub. No murmur heard. Pulmonary/Chest: Effort normal and breath sounds normal. No respiratory distress. He has no wheezes. He has no rales. He exhibits no tenderness. Abdominal: Soft. Musculoskeletal: He exhibits no edema. Neurological: He is alert. Psychiatric: He has a normal mood and affect. Nursing note and vitals reviewed. ASSESSMENT and PLAN  Diagnoses and all orders for this visit:    1. Prostate cancer screening  -     PSA SCREENING (SCREENING)    2. ROSENDO (obstructive sleep apnea)   On cpap   Weight reduction recommended  3. Essential hypertension  -     METABOLIC PANEL, COMPREHENSIVE   controlled  4. Pure hypercholesterolemia  -     METABOLIC PANEL, COMPREHENSIVE  -     LIPID PANEL   tolerating lipitor  5. Preventive   Had flu shot   Advised shingrix  Follow-up and Dispositions    · Return in about 6 months (around 4/24/2020) for medicare wellness. 10/07/2020    K 3.9 10/07/2020    CO2 21.0 (L) 10/07/2020     10/07/2020    BUN 10 10/07/2020    CREATININE 0.86 10/07/2020    EGFRIFNONA 79 10/07/2020    BCR 11.6 10/07/2020    ANIONGAP 14.0 10/07/2020     Lab Results   Component Value Date    WBC 8.16 10/07/2020    HGB 12.4 10/07/2020    HCT 37.1 10/07/2020    MCV 89.6 10/07/2020     10/07/2020     No results found for: INR, PROTIME  Lab Results   Component Value Date    TSH 0.754 07/12/2019       Cardiac Testing:      I personally viewed and interpreted the patient's EKG/Telemetry/lab data      ECG 12 Lead    Date/Time: 12/14/2020 8:54 AM  Performed by: Aki Chavarria APRN  Authorized by: Aki Chavarria APRN   Comparison: compared with previous ECG from 7/12/2019  Similar to previous ECG  Rhythm: sinus rhythm  BPM: 60            Assessment & Plan        Diagnoses and all orders for this visit:    1. Near syncope (Primary)    2. Dizziness    3. Vasovagal syncope    Other orders  -     ECG 12 Lead         Diagnosis Plan   1. Near syncope  Patient educated on mechanism of vasovagal syncope and avoidance of triggers   2. Dizziness  Patient educated on mechanism of vasovagal syncope and avoidance of triggers   3. Vasovagal syncope  Patient educated on mechanism of vasovagal syncope and avoidance of triggers       Plan:  Ms. Prachi Bailey is a 25-year-old white female seen in EP consultation today at the request of Dr. Cornejo for the evaluation of her recurrent dizziness with near syncope.  Review of echocardiogram and cardiac monitor revealed a normal functional and structural heart without abnormal arrhythmia.  Episodes of concern on the ambulatory heart rhythm monitor do not show heart block that is pathologic but rather show physiologic blocked PACs (no 2-1 AV block, no Mobitz 1 AV block, no complete heart block, no Mobitz 2 AV block).  These rhythm variants are unlinked to her vasovagal syncope.  Patient seen in evaluation with   Shefali in clinic today with diagnosis of vasovagal syncope and without further need for testing or therapies.  Patient educated on mechanism of vasovagal syncope and avoidance of triggers.  Patient verbalized understanding and we will see her back on a as needed basis.  Thank you for the consultation.     I spent 50 minutes in consultation with this patient which included more than 65% of this time in direct face-to-face counseling, physical examination and discussion of my assessment and findings and shared decision making with the patient.      Follow Up: As needed       Scribed for Jeremy Meehan DO by EVELIN Dawson. 12/14/2020  09:15 EST        Thank you for allowing me to participate in the care of your patient. Please to not hesitate to contact me with additional questions or concerns.        Jeremy Meehan DO, FACC, [facility]  Cardiac Electrophysiologist

## 2021-03-10 RX ORDER — OLMESARTAN MEDOXOMIL AND HYDROCHLOROTHIAZIDE 20/12.5 20; 12.5 MG/1; MG/1
1 TABLET ORAL DAILY
Qty: 90 TAB | Refills: 3 | Status: SHIPPED | OUTPATIENT
Start: 2021-03-10 | End: 2022-02-26

## 2021-03-10 NOTE — TELEPHONE ENCOUNTER
Future Appointments:  Future Appointments   Date Time Provider Laurence Mullen   4/28/2021  9:15 AM Angel Angeles MD [facility] AMB        Last Appointment With Me:  10/28/2020     Requested Prescriptions     Pending Prescriptions Disp Refills    olmesartan-hydroCHLOROthiazide (BENICAR HCT) 20-12.5 mg per tablet 90 Tab 3     Sig: Take 1 Tab by mouth daily.

## 2021-03-10 NOTE — TELEPHONE ENCOUNTER
----- Message from Bita Unger sent at 3/10/2021  1:16 PM EST -----  Regarding: Dr. Dung Scruggs refill  Medication Refill    Caller (if not patient):Pt      Relationship of caller (if not patient):N/A      Best contact number(s):[phone]      Name of medication and dosage if known: \"olmesartan medox hctv\" 20-12.5 mg       Is patient out of this medication (yes/no): no      Pharmacy name:[facility]    Pharmacy listed in chart? (yes/no):yes  Pharmacy phone       Details to clarify the request: Pt needs a refill sent to pharmacy.  He had about 10 days left      Message from [facility]

## 2021-04-27 NOTE — PROGRESS NOTES
HISTORY OF PRESENT ILLNESS  Elta Buerger is a 77 y.o. male. HPI   F/u htn hld asthma obesity, rosendo on cpap and medicare wellness-----  Last LDL 99  Plays golf for exercise and works in the yard  bp wnl at home per home  Sees urologist for prostate check-appt next month  Asthma symptoms controlled well on Breo per pt-Dr Robert Pugh  Last OV    Home BP -131/79 hr 67  Went to pt first last week ofr conjunctivitis --bp 138/80  Weight up 20 lbs since May  Exercising 2-3 d per week on bike and golf weekly  Asthma sxs on breo---no flares and on xolair too. Sees Allergy MD and sees pulm Dr Mike Vargas last month---had KIMI    Patient Active Problem List    Diagnosis Date Noted    ROSENDO (obstructive sleep apnea) 10/23/2019    Pure hypercholesterolemia 10/11/2018    Situational stress 12/15/2017    Essential hypertension 01/26/2017    Moderate persistent asthma without complication 21/75/0545    Sepsis ([facility] [address] 04/29/2016    Pneumonia 04/29/2016    Right knee DJD     Nephrolithiasis     Colon polyp     Ventral hernia 12/05/2012    Seasonal allergic rhinitis 10/03/2012    Male hypogonadism 11/11/2011    ED (erectile dysfunction)      Current Outpatient Medications   Medication Sig Dispense Refill    olmesartan-hydroCHLOROthiazide (BENICAR HCT) 20-12.5 mg per tablet Take 1 Tab by mouth daily. 90 Tab 3    cyanocobalamin (Vitamin B-12) 1,000 mcg/mL injection 1,000 mcg by IntraMUSCular route once.  cholecalciferol (VITAMIN D3) (5000 Units/125 mcg) tab tablet Take 5,000 Units by mouth two (2) times a day.  ascorbic acid, vitamin C, (Vitamin C) 500 mg tablet Take 1,000 mg by mouth.  ascorbic acid,sod/zinc ox,gluc (ZINC AND C PO) Take  by mouth. Every other day      Flaxseed Oil oil by Does Not Apply route.  atorvastatin (LIPITOR) 10 mg tablet Take 1 Tab by mouth daily. 90 Tab 3    fluticasone furoate-vilanterol (BREO ELLIPTA) 200-25 mcg/dose inhaler Take 1 Puff by inhalation daily.       omalizumab (XOLAIR SC) by SubCUTAneous route. 225 mg injection every 2 weeks      cpap machine kit by Does Not Apply route.  fluticasone propionate (FLONASE) 50 mcg/actuation nasal spray USE 2 SPRAYS IN EACH NOSTRIL DAILY 3 Bottle 3    montelukast (SINGULAIR) 10 mg tablet TAKE 1 TABLET BY MOUTH DAILY 90 Tab 3    TURMERIC, BULK, by Does Not Apply route.  B.infantis-B.ani-B.long-B.bifi (PROBIOTIC 4X) 10-15 mg TbEC Take  by mouth.  clomiPHENE (CLOMID) 50 mg tablet Take 50 mg by mouth daily.  folic acid (FOLVITE) 1 mg tablet Take  by mouth daily.  diclofenac EC (VOLTAREN) 50 mg EC tablet Take 50 mg by mouth.  mometasone-formoterol (DULERA) 200-5 mcg/actuation HFA inhaler Take 2 Puffs by inhalation two (2) times a day.  testosterone (ANDROGEL) 20.25 mg/1.25 gram (1.62 %) gel Apply 20.25 mg to affected area daily. Max Daily Amount: 20.25 mg. 3 Bottle 1    albuterol (VENTOLIN HFA) 90 mcg/actuation inhaler Take 2 puffs by inhalation every four (4) hours as needed for Wheezing. 1 Inhaler 5    multivitamin, stress formula (STRESS TAB) tablet Take 1 Tab by mouth daily.        Allergies   Allergen Reactions    Iodine Hives     IVP contrast/seafood      Lab Results   Component Value Date/Time    WBC 6.5 11/11/2020 08:27 AM    HGB 14.2 11/11/2020 08:27 AM    HCT 42.3 11/11/2020 08:27 AM    PLATELET 052 89/77/6612 08:27 AM    MCV 89 11/11/2020 08:27 AM     Lab Results   Component Value Date/Time    Hemoglobin A1c 5.8 (H) 11/11/2011 08:54 AM    Glucose 97 11/11/2020 08:27 AM    Microalb/Creat ratio (ug/mg creat.) 11.4 08/26/2014 09:25 AM    LDL, calculated 99 11/11/2020 08:27 AM    LDL, calculated 91 10/24/2019 08:48 AM    Creatinine 1.01 11/11/2020 08:27 AM      Lab Results   Component Value Date/Time    Cholesterol, total 147 11/11/2020 08:27 AM    HDL Cholesterol 38 (L) 11/11/2020 08:27 AM    LDL, calculated 99 11/11/2020 08:27 AM    LDL, calculated 91 10/24/2019 08:48 AM    Triglyceride 46 11/11/2020 08:27 AM    CHOL/HDL Ratio 4.2 08/14/2010 06:46 AM     Lab Results   Component Value Date/Time    GFR est non-AA 77 11/11/2020 08:27 AM    GFR est AA 89 11/11/2020 08:27 AM    Creatinine 1.01 11/11/2020 08:27 AM    BUN 17 11/11/2020 08:27 AM    Sodium 142 11/11/2020 08:27 AM    Potassium 4.3 11/11/2020 08:27 AM    Chloride 105 11/11/2020 08:27 AM    CO2 24 11/11/2020 08:27 AM        ROS    Physical Exam  Vitals signs and nursing note reviewed. Constitutional:       General: He is not in acute distress. Appearance: He is well-developed. He is obese. Comments: Appears stated age   HENT:      Head: Normocephalic. Cardiovascular:      Rate and Rhythm: Normal rate and regular rhythm. Heart sounds: Normal heart sounds. Pulmonary:      Effort: Pulmonary effort is normal.      Breath sounds: Normal breath sounds. Abdominal:      Palpations: Abdomen is soft. Neurological:      Mental Status: He is alert. ASSESSMENT and PLAN  Diagnoses and all orders for this visit:    1. Screening for AAA (abdominal aortic aneurysm)  -     US EXAM SCREENING AAA; Future    2. Essential hypertension   Reasonable control on benicar/hct  3. Pure hypercholesterolemia   On statin , refill         This is the Subsequent Medicare Annual Wellness Exam, performed 12 months or more after the Initial AWV or the last Subsequent AWV    I have reviewed the patient's medical history in detail and updated the computerized patient record. Assessment/Plan   Education and counseling provided:  Are appropriate based on today's review and evaluation  End-of-Life planning (with patient's consent)  screening US for AAA    1. Screening for AAA (abdominal aortic aneurysm)  -     US EXAM SCREENING AAA; Future  2. Essential hypertension  3.  Pure hypercholesterolemia       Depression Risk Factor Screening     3 most recent PHQ Screens 4/28/2021   Little interest or pleasure in doing things Not at all   Feeling down, depressed, irritable, or hopeless Not at all   Total Score PHQ 2 0       Alcohol Risk Screen    Do you average more than 1 drink per night or more than 7 drinks a week: No    In the past three months have you have had more than 4 drinks containing alcohol on one occasion: No        Functional Ability and Level of Safety    Hearing: Hearing is good. Activities of Daily Living: The home contains: no safety equipment. Patient does total self care      Ambulation: with no difficulty     Fall Risk:  Fall Risk Assessment, last 12 mths 4/28/2021   Able to walk? Yes   Fall in past 12 months? 0   Do you feel unsteady?  0   Are you worried about falling 0      Abuse Screen:  Patient is not abused       Cognitive Screening    Has your family/caregiver stated any concerns about your memory: no     Cognitive Screening: Normal - serial 3    Health Maintenance Due     Health Maintenance Due   Topic Date Due    Shingrix Vaccine Age 49> (1 of 2) Never done    AAA Screening 73-69 YO Male Smoking Patients  12/16/2018    Medicare Yearly Exam  04/29/2021       Patient Care Team   Patient Care Team:  Freddy Brice MD as PCP - General (Internal Medicine)  Freddy Brice MD as PCP - [address] [place] [address] Dr Landaverdeled Provider    History     Patient Active Problem List   Diagnosis Code    ED (erectile dysfunction) N52.9    Male hypogonadism E29.1    Seasonal allergic rhinitis J30.2    Ventral hernia K43.9    Colon polyp K63.5    Right knee DJD M17.11    Nephrolithiasis N20.0    Sepsis ([address] A41.9    Pneumonia J18.9    Essential hypertension I10    Moderate persistent asthma without complication P76.47    Situational stress F43.9    Pure hypercholesterolemia E78.00    ROSENDO (obstructive sleep apnea) G47.33     Past Medical History:   Diagnosis Date    Alcohol dependence ([address]     Asthma     X 27, seasonal     Colon polyp     ED (erectile dysfunction)     GERD (gastroesophageal reflux disease)     after meals only, diet controlled no meds    Hypertension     IBS (irritable bowel syndrome)     Nephrolithiasis     Positive PPD     Right knee DJD     Seasonal allergic rhinitis     Ventral hernia 12/5/2012      Past Surgical History:   Procedure Laterality Date    HX COLONOSCOPY  2014    due 2019    HX KNEE REPLACEMENT Left 2018    HX VASECTOMY      [place] PROSTATE BIOPSY, NEEDLE, SATURATION SAMPLING  2012    prostate bx     PROSTHESIS, PENILE, INFLATAB       Current Outpatient Medications   Medication Sig Dispense Refill    olmesartan-hydroCHLOROthiazide (BENICAR HCT) 20-12.5 mg per tablet Take 1 Tab by mouth daily. 90 Tab 3    cyanocobalamin (Vitamin B-12) 1,000 mcg/mL injection 1,000 mcg by IntraMUSCular route once.  cholecalciferol (VITAMIN D3) (5000 Units/125 mcg) tab tablet Take 5,000 Units by mouth two (2) times a day.  ascorbic acid, vitamin C, (Vitamin C) 500 mg tablet Take 1,000 mg by mouth.  ascorbic acid,sod/zinc ox,gluc (ZINC AND C PO) Take  by mouth. Every other day      Flaxseed Oil oil by Does Not Apply route.  fluticasone furoate-vilanterol (BREO ELLIPTA) 200-25 mcg/dose inhaler Take 1 Puff by inhalation daily.  omalizumab (XOLAIR SC) by SubCUTAneous route. 225 mg injection every 2 weeks      cpap machine kit by Does Not Apply route.  fluticasone propionate (FLONASE) 50 mcg/actuation nasal spray USE 2 SPRAYS IN EACH NOSTRIL DAILY 3 Bottle 3    montelukast (SINGULAIR) 10 mg tablet TAKE 1 TABLET BY MOUTH DAILY 90 Tab 3    multivitamin, stress formula (STRESS TAB) tablet Take 1 Tab by mouth daily.  atorvastatin (LIPITOR) 10 mg tablet Take 1 Tab by mouth daily. 90 Tab 3    TURMERIC, BULK, by Does Not Apply route.  B.infantis-B.ani-B.long-B.bifi (PROBIOTIC 4X) 10-15 mg TbEC Take  by mouth.  clomiPHENE (CLOMID) 50 mg tablet Take 50 mg by mouth daily.  folic acid (FOLVITE) 1 mg tablet Take  by mouth daily.  diclofenac EC (VOLTAREN) 50 mg EC tablet Take 50 mg by mouth.  mometasone-formoterol (DULERA) 200-5 mcg/actuation HFA inhaler Take 2 Puffs by inhalation two (2) times a day.  testosterone (ANDROGEL) 20.25 mg/1.25 gram (1.62 %) gel Apply 20.25 mg to affected area daily. Max Daily Amount: 20.25 mg. 3 Bottle 1    albuterol (VENTOLIN HFA) 90 mcg/actuation inhaler Take 2 puffs by inhalation every four (4) hours as needed for Wheezing.  1 Inhaler 5     Allergies   Allergen Reactions    Iodine Hives     IVP contrast/seafood       Family History   Problem Relation Age of Onset    Alcohol abuse Father     Diabetes Father     Cancer Father         larynx    Hypertension Mother     Alcohol abuse Paternal Grandmother     Diabetes Paternal Grandmother      Social History     Tobacco Use    Smoking status: Former Smoker     Packs/day: 1.00     Years: 12.00     Pack years: 12.00     Quit date: 1981     Years since quittin.6    Smokeless tobacco: Never Used   Substance Use Topics    Alcohol use: Yes     Frequency: Monthly or less     Drinks per session: 1 or 2     Binge frequency: Never     Comment: 6 glasses per month         Adama Mckeon MD

## 2021-04-28 ENCOUNTER — OFFICE VISIT (OUTPATIENT)
Dept: INTERNAL MEDICINE CLINIC | Age: 66
End: 2021-04-28
Payer: MEDICARE

## 2021-04-28 VITALS
TEMPERATURE: 97.8 F | RESPIRATION RATE: 16 BRPM | BODY MASS INDEX: 37.1 KG/M2 | WEIGHT: 265 LBS | HEIGHT: 71 IN | OXYGEN SATURATION: 98 % | SYSTOLIC BLOOD PRESSURE: 140 MMHG | HEART RATE: 70 BPM | DIASTOLIC BLOOD PRESSURE: 80 MMHG

## 2021-04-28 DIAGNOSIS — Z13.6 SCREENING FOR AAA (ABDOMINAL AORTIC ANEURYSM): Primary | ICD-10-CM

## 2021-04-28 DIAGNOSIS — E78.00 PURE HYPERCHOLESTEROLEMIA: ICD-10-CM

## 2021-04-28 DIAGNOSIS — Z00.00 MEDICARE ANNUAL WELLNESS VISIT, SUBSEQUENT: ICD-10-CM

## 2021-04-28 DIAGNOSIS — I10 ESSENTIAL HYPERTENSION: ICD-10-CM

## 2021-04-28 PROCEDURE — 99213 OFFICE O/P EST LOW 20 MIN: CPT | Performed by: INTERNAL MEDICINE

## 2021-04-28 PROCEDURE — 1101F PT FALLS ASSESS-DOCD LE1/YR: CPT | Performed by: INTERNAL MEDICINE

## 2021-04-28 PROCEDURE — G8753 SYS BP > OR = 140: HCPCS | Performed by: INTERNAL MEDICINE

## 2021-04-28 PROCEDURE — G8754 DIAS BP LESS 90: HCPCS | Performed by: INTERNAL MEDICINE

## 2021-04-28 PROCEDURE — 3017F COLORECTAL CA SCREEN DOC REV: CPT | Performed by: INTERNAL MEDICINE

## 2021-04-28 PROCEDURE — G8427 DOCREV CUR MEDS BY ELIG CLIN: HCPCS | Performed by: INTERNAL MEDICINE

## 2021-04-28 PROCEDURE — G8510 SCR DEP NEG, NO PLAN REQD: HCPCS | Performed by: INTERNAL MEDICINE

## 2021-04-28 PROCEDURE — G0439 PPPS, SUBSEQ VISIT: HCPCS | Performed by: INTERNAL MEDICINE

## 2021-04-28 PROCEDURE — G8417 CALC BMI ABV UP PARAM F/U: HCPCS | Performed by: INTERNAL MEDICINE

## 2021-04-28 PROCEDURE — G8536 NO DOC ELDER MAL SCRN: HCPCS | Performed by: INTERNAL MEDICINE

## 2021-04-28 RX ORDER — ATORVASTATIN CALCIUM 10 MG/1
10 TABLET, FILM COATED ORAL DAILY
Qty: 90 TAB | Refills: 3 | Status: SHIPPED | OUTPATIENT
Start: 2021-04-28 | End: 2022-02-26

## 2021-04-28 NOTE — TELEPHONE ENCOUNTER
Future Appointments:  No future appointments. Last Appointment With Me:  4/28/2021     Requested Prescriptions     Pending Prescriptions Disp Refills    atorvastatin (LIPITOR) 10 mg tablet 90 Tab 3     Sig: Take 1 Tab by mouth daily.

## 2021-04-28 NOTE — PATIENT INSTRUCTIONS
Office Policies    Phone calls/patient messages:            Please allow up to 24 hours for someone in the office to contact you about your call or message. Be mindful your provider may be out of the office or your message may require further review. We encourage you to use [employer] for your messages as this is a faster, more efficient way to communicate with our office                         Medication Refills:            Prescription medications require 48-72 business hours to process. We encourage you to use [employer] for your refills. For controlled medications: Please allow 72 business hours to process. Certain medications may require you to  a written prescription at our office. NO narcotic/controlled medications will be prescribed after 4pm Monday through Friday or on weekends              Form/Paperwork Completion:            Please note a $25 fee may incur for all paperwork for completed by our providers. We ask that you allow 7-10 business days. Pre-payment is due prior to picking up/faxing the completed form. You may also download your forms to [employer] to have your doctor print off. Medicare Wellness Visit, Male    The best way to live healthy is to have a lifestyle where you eat a well-balanced diet, exercise regularly, limit alcohol use, and quit all forms of tobacco/nicotine, if applicable. Regular preventive services are another way to keep healthy. Preventive services (vaccines, screening tests, monitoring & exams) can help personalize your care plan, which helps you manage your own care. Screening tests can find health problems at the earliest stages, when they are easiest to treat. [facility] follows the current, evidence-based guidelines published by the [facility] [employer] Seymour (USPSTF) when recommending preventive services for our patients.  Because we follow these guidelines, sometimes recommendations change over time as research supports it. (For example, a prostate screening blood test is no longer routinely recommended for men with no symptoms). Of course, you and your doctor may decide to screen more often for some diseases, based on your risk and co-morbidities (chronic disease you are already diagnosed with). Preventive services for you include:  - Medicare offers their members a free annual wellness visit, which is time for you and your primary care provider to discuss and plan for your preventive service needs. Take advantage of this benefit every year!  -All adults over age 72 should receive the recommended pneumonia vaccines. Current USPSTF guidelines recommend a series of two vaccines for the best pneumonia protection.   -All adults should have a flu vaccine yearly and tetanus vaccine every 10 years.  -All adults age 48 and older should receive the shingles vaccines (series of two vaccines). -All adults age 38-68 who are overweight should have a diabetes screening test once every three years.   -Other screening tests & preventive services for persons with diabetes include: an eye exam to screen for diabetic retinopathy, a kidney function test, a foot exam, and stricter control over your cholesterol.   -Cardiovascular screening for adults with routine risk involves an electrocardiogram (ECG) at intervals determined by the provider.   -Colorectal cancer screening should be done for adults age 54-65 with no increased risk factors for colorectal cancer. There are a number of acceptable methods of screening for this type of cancer. Each test has its own benefits and drawbacks. Discuss with your provider what is most appropriate for you during your annual wellness visit.  The different tests include: colonoscopy (considered the best screening method), a fecal occult blood test, a fecal DNA test, and sigmoidoscopy.  -All adults born between [facility] should be screened once for Hepatitis C.  -An Abdominal Aortic Aneurysm (AAA) Screening is recommended for men age 73-68 who has ever smoked in their lifetime.      Here is a list of your current Health Maintenance items (your personalized list of preventive services) with a due date:  Health Maintenance Due   Topic Date Due    Shingles Vaccine (1 of 2) Never done    AAA Screening  12/16/2018

## 2021-05-14 ENCOUNTER — HOSPITAL ENCOUNTER (OUTPATIENT)
Dept: ULTRASOUND IMAGING | Age: 66
Discharge: HOME | End: 2021-05-14
Attending: INTERNAL MEDICINE
Payer: MEDICARE

## 2021-05-14 DIAGNOSIS — Z13.6 SCREENING FOR AAA (ABDOMINAL AORTIC ANEURYSM): ICD-10-CM

## 2021-05-14 PROCEDURE — 76706 US ABDL AORTA SCREEN AAA: CPT

## 2021-11-17 NOTE — PROGRESS NOTES
HISTORY OF PRESENT ILLNESS  Talita Carmichael is a 77 y.o. male. HPI   F/u htn hld asthma obesity, rosendo on cpap   Sees Dr Delonte Mensah for severe ROSENDO and severe asthma but well controlled on xolair  Sees URO MD earlyt--s/p penile implant  Plays golf weekly and cycles 1-2 times per week   Admits has not been strict with diet , eats too much including desserts  Last OV      Last LDL 80  Plays golf for exercise and works in the yard  bp wnl at home per home  Sees urologist for prostate check-appt next month  Asthma symptoms controlled well on Breo per pt-Dr Delonte Mensah    Patient Active Problem List    Diagnosis Date Noted    ROSENDO (obstructive sleep apnea) 10/23/2019    Pure hypercholesterolemia 10/11/2018    Situational stress 12/15/2017    Essential hypertension 01/26/2017    Moderate persistent asthma without complication 39/75/0510    Sepsis ([address] 04/29/2016    Pneumonia 04/29/2016    Right knee DJD     Nephrolithiasis     Colon polyp     Ventral hernia 12/05/2012    Seasonal allergic rhinitis 10/03/2012    Male hypogonadism 11/11/2011    ED (erectile dysfunction)      Current Outpatient Medications   Medication Sig Dispense Refill    prednisoLONE acetate (PRED FORTE) 1 % ophthalmic suspension       atorvastatin (LIPITOR) 10 mg tablet Take 1 Tab by mouth daily. 90 Tab 3    olmesartan-hydroCHLOROthiazide (BENICAR HCT) 20-12.5 mg per tablet Take 1 Tab by mouth daily. 90 Tab 3    cyanocobalamin (Vitamin B-12) 1,000 mcg/mL injection 1,000 mcg by IntraMUSCular route once.  cholecalciferol (VITAMIN D3) (5000 Units/125 mcg) tab tablet Take 5,000 Units by mouth two (2) times a day.  ascorbic acid, vitamin C, (Vitamin C) 500 mg tablet Take 1,000 mg by mouth.  Flaxseed Oil oil by Does Not Apply route.  fluticasone furoate-vilanterol (BREO ELLIPTA) 200-25 mcg/dose inhaler Take 1 Puff by inhalation daily.  omalizumab (XOLAIR SC) by SubCUTAneous route.  225 mg injection every 2 weeks      cpap machine kit by Does Not Apply route.  fluticasone propionate (FLONASE) 50 mcg/actuation nasal spray USE 2 SPRAYS IN EACH NOSTRIL DAILY 3 Bottle 3    montelukast (SINGULAIR) 10 mg tablet TAKE 1 TABLET BY MOUTH DAILY 90 Tab 3    B.infantis-B.ani-B.long-B.bifi (PROBIOTIC 4X) 10-15 mg TbEC Take  by mouth.  clomiPHENE (CLOMID) 50 mg tablet Take 50 mg by mouth daily.  albuterol (VENTOLIN HFA) 90 mcg/actuation inhaler Take 2 puffs by inhalation every four (4) hours as needed for Wheezing. 1 Inhaler 5    multivitamin, stress formula (STRESS TAB) tablet Take 1 Tab by mouth daily.  ascorbic acid,sod/zinc ox,gluc (ZINC AND C PO) Take  by mouth. Every other day (Patient not taking: Reported on 11/18/2021)      TURMERIC, BULK, by Does Not Apply route. (Patient not taking: Reported on 92/53/4541)      folic acid (FOLVITE) 1 mg tablet Take  by mouth daily. (Patient not taking: Reported on 11/18/2021)      diclofenac EC (VOLTAREN) 50 mg EC tablet Take 50 mg by mouth. (Patient not taking: Reported on 11/18/2021)      mometasone-formoterol (DULERA) 200-5 mcg/actuation HFA inhaler Take 2 Puffs by inhalation two (2) times a day.  (Patient not taking: Reported on 11/18/2021)       Allergies   Allergen Reactions    Iodine Hives     IVP contrast/seafood      Lab Results   Component Value Date/Time    WBC 6.5 11/11/2020 08:27 AM    HGB 14.2 11/11/2020 08:27 AM    HCT 42.3 11/11/2020 08:27 AM    PLATELET 941 62/09/9413 08:27 AM    MCV 89 11/11/2020 08:27 AM     Lab Results   Component Value Date/Time    Hemoglobin A1c 5.8 (H) 11/11/2011 08:54 AM    Glucose 97 11/11/2020 08:27 AM    Microalb/Creat ratio (ug/mg creat.) 11.4 08/26/2014 09:25 AM    LDL, calculated 99 11/11/2020 08:27 AM    LDL, calculated 91 10/24/2019 08:48 AM    Creatinine 1.01 11/11/2020 08:27 AM      Lab Results   Component Value Date/Time    Cholesterol, total 147 11/11/2020 08:27 AM    HDL Cholesterol 38 (L) 11/11/2020 08:27 AM    LDL, calculated 99 11/11/2020 08:27 AM    LDL, calculated 91 10/24/2019 08:48 AM    Triglyceride 46 11/11/2020 08:27 AM    CHOL/HDL Ratio 4.2 08/14/2010 06:46 AM     Lab Results   Component Value Date/Time    GFR est non-AA 77 11/11/2020 08:27 AM    GFR est AA 89 11/11/2020 08:27 AM    Creatinine 1.01 11/11/2020 08:27 AM    BUN 17 11/11/2020 08:27 AM    Sodium 142 11/11/2020 08:27 AM    Potassium 4.3 11/11/2020 08:27 AM    Chloride 105 11/11/2020 08:27 AM    CO2 24 11/11/2020 08:27 AM     No results found for: TSH, TSH2, TSH3, TSHP, TSHELE, TSHEXT, TT3, T3U, T3UP, FRT3, FT3, FT4, FT4P, T4, T4P, FT4T, TT7, TSHEXT      Review of Systems   Constitutional: Negative for chills, fever, malaise/fatigue and weight loss. HENT: Negative. Eyes: Negative for blurred vision and double vision. Respiratory: Negative for cough and shortness of breath. Cardiovascular: Negative for chest pain and palpitations. Gastrointestinal: Negative for abdominal pain, blood in stool, constipation, diarrhea, melena, nausea and vomiting. Genitourinary: Negative for dysuria, frequency, hematuria and urgency. Musculoskeletal: Negative for back pain, falls, joint pain and myalgias. Skin: Negative. Neurological: Negative for dizziness, tremors and headaches. Psychiatric/Behavioral: Negative. Physical Exam  Vitals and nursing note reviewed. Constitutional:       General: He is not in acute distress. Appearance: Normal appearance. He is well-developed. He is obese. Comments: Appears stated age   HENT:      Head: Normocephalic. Right Ear: Tympanic membrane normal.      Left Ear: Tympanic membrane normal.   Cardiovascular:      Rate and Rhythm: Normal rate and regular rhythm. Heart sounds: Normal heart sounds. Pulmonary:      Effort: Pulmonary effort is normal.      Breath sounds: Normal breath sounds. Abdominal:      Palpations: Abdomen is soft.    Musculoskeletal:      Cervical back: Normal range of motion. Skin:     General: Skin is warm. Neurological:      Mental Status: He is alert. Psychiatric:         Mood and Affect: Mood normal.         Behavior: Behavior normal.         Thought Content: Thought content normal.         Judgment: Judgment normal.         ASSESSMENT and PLAN  Diagnoses and all orders for this visit:    1. HTN (hypertension), benign  -     CBC W/O DIFF; Future  -     METABOLIC PANEL, COMPREHENSIVE; Future   controlled  2. Pure hypercholesterolemia  -     METABOLIC PANEL, COMPREHENSIVE; Future  -     LIPID PANEL; Future   Continue statin, diet and exericse  3. Obesity (BMI 30-39. 9)    I have reviewed/discussed the above normal BMI with the patient. I have recommended the following interventions: dietary management education, guidance, and counseling and encourage exercise . Renato Cedeno 4. Severe obesity (BMI 35.0-35.9 with comorbidity) ([address]    5. Preventive   Recommended tdap  Follow-up and Dispositions    · Return in about 6 months (around 5/18/2022) for medicare wellness x 30 min.

## 2021-11-18 ENCOUNTER — OFFICE VISIT (OUTPATIENT)
Dept: INTERNAL MEDICINE CLINIC | Age: 66
End: 2021-11-18
Payer: MEDICARE

## 2021-11-18 VITALS
WEIGHT: 267 LBS | TEMPERATURE: 97.7 F | BODY MASS INDEX: 37.38 KG/M2 | SYSTOLIC BLOOD PRESSURE: 130 MMHG | DIASTOLIC BLOOD PRESSURE: 80 MMHG | RESPIRATION RATE: 16 BRPM | HEIGHT: 71 IN | OXYGEN SATURATION: 96 % | HEART RATE: 63 BPM

## 2021-11-18 DIAGNOSIS — E66.01 SEVERE OBESITY (BMI 35.0-35.9 WITH COMORBIDITY) (HCC): ICD-10-CM

## 2021-11-18 DIAGNOSIS — E66.9 OBESITY (BMI 30-39.9): ICD-10-CM

## 2021-11-18 DIAGNOSIS — E78.00 PURE HYPERCHOLESTEROLEMIA: ICD-10-CM

## 2021-11-18 DIAGNOSIS — I10 HTN (HYPERTENSION), BENIGN: Primary | ICD-10-CM

## 2021-11-18 LAB
ALBUMIN SERPL-MCNC: 3.6 G/DL (ref 3.5–5)
ALBUMIN/GLOB SERPL: 0.8 {RATIO} (ref 1.1–2.2)
ALP SERPL-CCNC: 79 U/L (ref 45–117)
ALT SERPL-CCNC: 26 U/L (ref 12–78)
ANION GAP SERPL CALC-SCNC: 4 MMOL/L (ref 5–15)
AST SERPL-CCNC: 19 U/L (ref 15–37)
BILIRUB SERPL-MCNC: 0.4 MG/DL (ref 0.2–1)
BUN SERPL-MCNC: 20 MG/DL (ref 6–20)
BUN/CREAT SERPL: 20 (ref 12–20)
CALCIUM SERPL-MCNC: 9.6 MG/DL (ref 8.5–10.1)
CHLORIDE SERPL-SCNC: 105 MMOL/L (ref 97–108)
CHOLEST SERPL-MCNC: 146 MG/DL
CO2 SERPL-SCNC: 29 MMOL/L (ref 21–32)
CREAT SERPL-MCNC: 1.01 MG/DL (ref 0.7–1.3)
ERYTHROCYTE [DISTWIDTH] IN BLOOD BY AUTOMATED COUNT: 14.6 % (ref 11.5–14.5)
GLOBULIN SER CALC-MCNC: 4.8 G/DL (ref 2–4)
GLUCOSE SERPL-MCNC: 93 MG/DL (ref 65–100)
HCT VFR BLD AUTO: 42.5 % (ref 36.6–50.3)
HDLC SERPL-MCNC: 44 MG/DL
HDLC SERPL: 3.3 {RATIO} (ref 0–5)
HGB BLD-MCNC: 13.7 G/DL (ref 12.1–17)
LDLC SERPL CALC-MCNC: 94 MG/DL (ref 0–100)
MCH RBC QN AUTO: 29.6 PG (ref 26–34)
MCHC RBC AUTO-ENTMCNC: 32.2 G/DL (ref 30–36.5)
MCV RBC AUTO: 91.8 FL (ref 80–99)
NRBC # BLD: 0 K/UL (ref 0–0.01)
NRBC BLD-RTO: 0 PER 100 WBC
PLATELET # BLD AUTO: 257 K/UL (ref 150–400)
PMV BLD AUTO: 11 FL (ref 8.9–12.9)
POTASSIUM SERPL-SCNC: 4.4 MMOL/L (ref 3.5–5.1)
PROT SERPL-MCNC: 8.4 G/DL (ref 6.4–8.2)
RBC # BLD AUTO: 4.63 M/UL (ref 4.1–5.7)
SODIUM SERPL-SCNC: 138 MMOL/L (ref 136–145)
TRIGL SERPL-MCNC: 40 MG/DL (ref ?–150)
VLDLC SERPL CALC-MCNC: 8 MG/DL
WBC # BLD AUTO: 6.6 K/UL (ref 4.1–11.1)

## 2021-11-18 PROCEDURE — 3017F COLORECTAL CA SCREEN DOC REV: CPT | Performed by: INTERNAL MEDICINE

## 2021-11-18 PROCEDURE — 1101F PT FALLS ASSESS-DOCD LE1/YR: CPT | Performed by: INTERNAL MEDICINE

## 2021-11-18 PROCEDURE — G8754 DIAS BP LESS 90: HCPCS | Performed by: INTERNAL MEDICINE

## 2021-11-18 PROCEDURE — G8432 DEP SCR NOT DOC, RNG: HCPCS | Performed by: INTERNAL MEDICINE

## 2021-11-18 PROCEDURE — 99213 OFFICE O/P EST LOW 20 MIN: CPT | Performed by: INTERNAL MEDICINE

## 2021-11-18 PROCEDURE — G8427 DOCREV CUR MEDS BY ELIG CLIN: HCPCS | Performed by: INTERNAL MEDICINE

## 2021-11-18 PROCEDURE — G8536 NO DOC ELDER MAL SCRN: HCPCS | Performed by: INTERNAL MEDICINE

## 2021-11-18 PROCEDURE — G8752 SYS BP LESS 140: HCPCS | Performed by: INTERNAL MEDICINE

## 2021-11-18 PROCEDURE — G0463 HOSPITAL OUTPT CLINIC VISIT: HCPCS | Performed by: INTERNAL MEDICINE

## 2021-11-18 PROCEDURE — G8417 CALC BMI ABV UP PARAM F/U: HCPCS | Performed by: INTERNAL MEDICINE

## 2021-11-18 RX ORDER — PREDNISOLONE ACETATE 10 MG/ML
SUSPENSION/ DROPS OPHTHALMIC
COMMUNITY
Start: 2021-11-04

## 2021-11-18 NOTE — PATIENT INSTRUCTIONS
Office Policies    Phone calls/patient messages:            Please allow up to 24 hours for someone in the office to contact you about your call or message. Be mindful your provider may be out of the office or your message may require further review. We encourage you to use [employer] for your messages as this is a faster, more efficient way to communicate with our office                         Medication Refills:            Prescription medications require 48-72 business hours to process. We encourage you to use [employer] for your refills. For controlled medications: Please allow 72 business hours to process. Certain medications may require you to  a written prescription at our office. NO narcotic/controlled medications will be prescribed after 4pm Monday through Friday or on weekends              Form/Paperwork Completion:            Please note a $25 fee may incur for all paperwork for completed by our providers. We ask that you allow 7-10 business days. Pre-payment is due prior to picking up/faxing the completed form. You may also download your forms to [employer] to have your doctor print off.

## 2021-11-19 NOTE — PROGRESS NOTES
Faxed add on request to [facility] for [place]

## 2021-11-22 LAB
ALBUMIN SERPL ELPH-MCNC: 3.3 G/DL (ref 2.9–4.4)
ALBUMIN/GLOB SERPL: 0.8 {RATIO} (ref 0.7–1.7)
ALPHA1 GLOB SERPL ELPH-MCNC: 0.2 G/DL (ref 0–0.4)
ALPHA2 GLOB SERPL ELPH-MCNC: 0.7 G/DL (ref 0.4–1)
B-GLOBULIN SERPL ELPH-MCNC: 1.4 G/DL (ref 0.7–1.3)
GAMMA GLOB SERPL ELPH-MCNC: 1.9 G/DL (ref 0.4–1.8)
GLOBULIN SER CALC-MCNC: 4.3 G/DL (ref 2.2–3.9)
M PROTEIN SERPL ELPH-MCNC: ABNORMAL G/DL
PROT SERPL-MCNC: 7.6 G/DL (ref 6–8.5)

## 2022-02-26 RX ORDER — OLMESARTAN MEDOXOMIL AND HYDROCHLOROTHIAZIDE 20/12.5 20; 12.5 MG/1; MG/1
TABLET ORAL
Qty: 90 TABLET | Refills: 3 | Status: SHIPPED | OUTPATIENT
Start: 2022-02-26

## 2022-02-26 RX ORDER — ATORVASTATIN CALCIUM 10 MG/1
TABLET, FILM COATED ORAL
Qty: 90 TABLET | Refills: 3 | Status: SHIPPED | OUTPATIENT
Start: 2022-02-26

## 2022-03-18 PROBLEM — J45.40 MODERATE PERSISTENT ASTHMA WITHOUT COMPLICATION: Status: ACTIVE | Noted: 2017-01-26

## 2022-03-19 PROBLEM — F43.9 SITUATIONAL STRESS: Status: ACTIVE | Noted: 2017-12-15

## 2022-03-19 PROBLEM — E78.00 PURE HYPERCHOLESTEROLEMIA: Status: ACTIVE | Noted: 2018-10-11

## 2022-03-19 PROBLEM — I10 ESSENTIAL HYPERTENSION: Status: ACTIVE | Noted: 2017-01-26

## 2022-03-19 PROBLEM — G47.33 OSA (OBSTRUCTIVE SLEEP APNEA): Status: ACTIVE | Noted: 2019-10-23

## 2022-05-18 NOTE — PROGRESS NOTES
HISTORY OF PRESENT ILLNESS  Hong Marroquin is a 74 y.o. male. HPI   F/u htn hld eosinophilic asthma obesity, rosendo on cpap and medicare wellness---  Weight--up 6 lbs but weight down recently per pt  Asthma symptoms controlled on Xolair-Dr Mejias. On cpap  Sees Dr Cindy vance [facility] ED s/p penile implant and low testosterone--PSA per [facility] MD. testoserone level was ok per pt--PSA may not have been logged  Had colonoscopy last month-polyps removed--Dr John Mcintyre for severe ROSENDO and severe asthma but well controlled on xolair  Sees [facility] MD earlyt--s/p penile implant  Plays golf weekly and cycles 1-2 times per week   Admits has not been strict with diet , eats too much including desserts    Patient Active Problem List    Diagnosis Date Noted    ROSENDO (obstructive sleep apnea) 10/23/2019    Pure hypercholesterolemia 10/11/2018    Situational stress 12/15/2017    Essential hypertension 01/26/2017    Moderate persistent asthma without complication 89/69/7908    Sepsis ([facility] [address] 04/29/2016    Pneumonia 04/29/2016    Right knee DJD     Nephrolithiasis     Colon polyp     Ventral hernia 12/05/2012    Seasonal allergic rhinitis 10/03/2012    Male hypogonadism 11/11/2011    ED (erectile dysfunction)      Current Outpatient Medications   Medication Sig Dispense Refill    olmesartan-hydroCHLOROthiazide (BENICAR HCT) 20-12.5 mg per tablet TAKE 1 TABLET BY MOUTH DAILY 90 Tablet 3    atorvastatin (LIPITOR) 10 mg tablet TAKE 1 TABLET BY MOUTH DAILY 90 Tablet 3    prednisoLONE acetate (PRED FORTE) 1 % ophthalmic suspension       cyanocobalamin (Vitamin B-12) 1,000 mcg/mL injection 1,000 mcg by IntraMUSCular route once.  cholecalciferol (VITAMIN D3) (5000 Units/125 mcg) tab tablet Take 5,000 Units by mouth two (2) times a day.  ascorbic acid, vitamin C, (Vitamin C) 500 mg tablet Take 1,000 mg by mouth.  ascorbic acid,sod/zinc ox,gluc (ZINC AND C PO) Take  by mouth.  Every other day (Patient not taking: Reported on 11/18/2021)      Flaxseed Oil oil by Does Not Apply route.  fluticasone furoate-vilanterol (BREO ELLIPTA) 200-25 mcg/dose inhaler Take 1 Puff by inhalation daily.  omalizumab (XOLAIR SC) by SubCUTAneous route. 225 mg injection every 2 weeks      cpap machine kit by Does Not Apply route.  fluticasone propionate (FLONASE) 50 mcg/actuation nasal spray USE 2 SPRAYS IN EACH NOSTRIL DAILY 3 Bottle 3    montelukast (SINGULAIR) 10 mg tablet TAKE 1 TABLET BY MOUTH DAILY 90 Tab 3    B.infantis-B.ani-B.long-B.bifi (PROBIOTIC 4X) 10-15 mg TbEC Take  by mouth.  clomiPHENE (CLOMID) 50 mg tablet Take 50 mg by mouth daily.  diclofenac EC (VOLTAREN) 50 mg EC tablet Take 50 mg by mouth. (Patient not taking: Reported on 11/18/2021)      albuterol (VENTOLIN HFA) 90 mcg/actuation inhaler Take 2 puffs by inhalation every four (4) hours as needed for Wheezing. 1 Inhaler 5    multivitamin, stress formula (STRESS TAB) tablet Take 1 Tab by mouth daily.        Allergies   Allergen Reactions    Iodine Hives     IVP contrast/seafood      Lab Results   Component Value Date/Time    WBC 6.6 11/18/2021 10:25 AM    HGB 13.7 11/18/2021 10:25 AM    HCT 42.5 11/18/2021 10:25 AM    PLATELET 491 96/58/0473 10:25 AM    MCV 91.8 11/18/2021 10:25 AM     Lab Results   Component Value Date/Time    Hemoglobin A1c 5.8 (H) 11/11/2011 08:54 AM    Glucose 93 11/18/2021 10:25 AM    Microalb/Creat ratio (ug/mg creat.) 11.4 08/26/2014 09:25 AM    LDL, calculated 94 11/18/2021 10:25 AM    Creatinine 1.01 11/18/2021 10:25 AM      Lab Results   Component Value Date/Time    Cholesterol, total 146 11/18/2021 10:25 AM    HDL Cholesterol 44 11/18/2021 10:25 AM    LDL, calculated 94 11/18/2021 10:25 AM    Triglyceride 40 11/18/2021 10:25 AM    CHOL/HDL Ratio 3.3 11/18/2021 10:25 AM     Lab Results   Component Value Date/Time    GFR est non-AA >60 11/18/2021 10:25 AM    GFR est AA >60 11/18/2021 10:25 AM    Creatinine 1.01 11/18/2021 10:25 AM    BUN 20 11/18/2021 10:25 AM    Sodium 138 11/18/2021 10:25 AM    Potassium 4.4 11/18/2021 10:25 AM    Chloride 105 11/18/2021 10:25 AM    CO2 29 11/18/2021 10:25 AM     Lab Results   Component Value Date/Time    Prostate Specific Ag 0.7 10/24/2019 08:48 AM    Prostate Specific Ag 0.7 02/16/2015 09:13 AM    Prostate Specific Ag 0.6 11/11/2013 10:36 AM    PSA 0.9 08/14/2010 06:46 AM    FREE PSA Not Done 08/14/2010 06:46 AM    % FREE PSA Not Done 08/14/2010 06:46 AM        ROS    Physical Exam  Vitals and nursing note reviewed. Constitutional:       General: He is not in acute distress. Appearance: Normal appearance. He is well-developed. He is obese. Comments: Appears stated age   HENT:      Head: Normocephalic. Nose: Nose normal.   Cardiovascular:      Rate and Rhythm: Normal rate and regular rhythm. Pulses: Normal pulses. Heart sounds: Normal heart sounds. No murmur heard. No friction rub. No gallop. Pulmonary:      Effort: Pulmonary effort is normal.      Breath sounds: Normal breath sounds. Abdominal:      Palpations: Abdomen is soft. Musculoskeletal:      Cervical back: Normal range of motion. Neurological:      General: No focal deficit present. Mental Status: He is alert. ASSESSMENT and PLAN  Diagnoses and all orders for this visit:    1. Hypertension, unspecified type   controlled  2. Pure hypercholesterolemia   continue statin-cmp/lipids orderd  3. Uncomplicated asthma, unspecified asthma severity, unspecified whether persistent   Controlled on xolair  4. ROSENDO on CPAP    5. Obesity (BMI 30-39. 9)   Weight reduction needed-low carb diet discussed  6. BPH   asymptomatic--PSA   [facility]         This is the Subsequent Medicare Annual Wellness Exam, performed 12 months or more after the Initial AWV or the last Subsequent AWV    I have reviewed the patient's medical history in detail and updated the computerized patient record. Assessment/Plan   Education and counseling provided:  Are appropriate based on today's review and evaluation  Prostate cancer screening tests (PSA, covered annually)    1. Hypertension, unspecified type  2. Pure hypercholesterolemia  3. Uncomplicated asthma, unspecified asthma severity, unspecified whether persistent  4. ROSENDO on CPAP  5. Obesity (BMI 30-39. 9)       Depression Risk Factor Screening     3 most recent PHQ Screens 5/19/2022   Little interest or pleasure in doing things Not at all   Feeling down, depressed, irritable, or hopeless Not at all   Total Score PHQ 2 0       Alcohol & Drug Abuse Risk Screen    Do you average more than 1 drink per night or more than 7 drinks a week: No    In the past three months have you have had more than 4 drinks containing alcohol on one occasion: No          Functional Ability and Level of Safety    Hearing: Hearing is good. Activities of Daily Living: The home contains: no safety equipment. Patient does total self care      Ambulation: with no difficulty     Fall Risk:  Fall Risk Assessment, last 12 mths 5/19/2022   Able to walk? Yes   Fall in past 12 months? 0   Do you feel unsteady?  0   Are you worried about falling 0      Abuse Screen:  Patient is not abused       Cognitive Screening    Has your family/caregiver stated any concerns about your memory: no     Cognitive Screening: Normal - Verbal Fluency Test    Health Maintenance Due     Health Maintenance Due   Topic Date Due    Depression Screen  04/28/2022    Medicare Yearly Exam  04/29/2022       Patient Care Team   Patient Care Team:  Leah Gibson MD as PCP - General (Internal Medicine Physician)  Leah Gibson MD as PCP - Franko Argueta Provider    History     Patient Active Problem List   Diagnosis Code    ED (erectile dysfunction) N52.9    Male hypogonadism E29.1    Seasonal allergic rhinitis J30.2    Ventral hernia K43.9    Colon polyp K63.5    Right knee DJD M17.11    Nephrolithiasis N20.0  Sepsis ([facility] A41.9    Pneumonia J18.9    Essential hypertension I10    Moderate persistent asthma without complication K93.14    Situational stress F43.9    Pure hypercholesterolemia E78.00    ROSENDO (obstructive sleep apnea) G47.33     Past Medical History:   Diagnosis Date    Alcohol dependence ([facility]     Asthma     X 27, seasonal     Colon polyp     ED (erectile dysfunction)     GERD (gastroesophageal reflux disease)     after meals only, diet controlled no meds    Hypertension     IBS (irritable bowel syndrome)     Nephrolithiasis     Positive PPD     Right knee DJD     Seasonal allergic rhinitis     Ventral hernia 12/5/2012      Past Surgical History:   Procedure Laterality Date    HX COLONOSCOPY  2014    due 2019    HX KNEE REPLACEMENT Left 2018    HX VASECTOMY      FL PROSTATE BIOPSY, NEEDLE, SATURATION SAMPLING  2012    prostate bx     PROSTHESIS, PENILE, INFLATAB       Current Outpatient Medications   Medication Sig Dispense Refill    olmesartan-hydroCHLOROthiazide (BENICAR HCT) 20-12.5 mg per tablet TAKE 1 TABLET BY MOUTH DAILY 90 Tablet 3    atorvastatin (LIPITOR) 10 mg tablet TAKE 1 TABLET BY MOUTH DAILY 90 Tablet 3    prednisoLONE acetate (PRED FORTE) 1 % ophthalmic suspension       cyanocobalamin (Vitamin B-12) 1,000 mcg/mL injection 1,000 mcg by IntraMUSCular route once.  cholecalciferol (VITAMIN D3) (5000 Units/125 mcg) tab tablet Take 5,000 Units by mouth two (2) times a day.  ascorbic acid, vitamin C, (Vitamin C) 500 mg tablet Take 1,000 mg by mouth.  Flaxseed Oil oil by Does Not Apply route.  fluticasone furoate-vilanterol (BREO ELLIPTA) 200-25 mcg/dose inhaler Take 1 Puff by inhalation daily.  omalizumab (XOLAIR SC) by SubCUTAneous route. 225 mg injection every 2 weeks      cpap machine kit by Does Not Apply route.       fluticasone propionate (FLONASE) 50 mcg/actuation nasal spray USE 2 SPRAYS IN EACH NOSTRIL DAILY 3 Bottle 3    montelukast (SINGULAIR) 10 mg tablet TAKE 1 TABLET BY MOUTH DAILY 90 Tab 3    B.infantis-B.ani-B.long-B.bifi (PROBIOTIC 4X) 10-15 mg TbEC Take  by mouth.  multivitamin, stress formula (STRESS TAB) tablet Take 1 Tab by mouth daily.  albuterol (VENTOLIN HFA) 90 mcg/actuation inhaler Take 2 puffs by inhalation every four (4) hours as needed for Wheezing.  1 Inhaler 5     Allergies   Allergen Reactions    Iodine Hives     IVP contrast/seafood       Family History   Problem Relation Age of Onset    Alcohol abuse Father     Diabetes Father     Cancer Father         larynx    Hypertension Mother     Alcohol abuse Paternal Grandmother     Diabetes Paternal Grandmother      Social History     Tobacco Use    Smoking status: Former Smoker     Packs/day: 1.00     Years: 12.00     Pack years: 12.00     Quit date: 1981     Years since quittin.7    Smokeless tobacco: Never Used   Substance Use Topics    Alcohol use: Yes     Comment: Seldom         Teresa Harris MD

## 2022-05-19 ENCOUNTER — OFFICE VISIT (OUTPATIENT)
Dept: INTERNAL MEDICINE CLINIC | Age: 67
End: 2022-05-19
Payer: MEDICARE

## 2022-05-19 VITALS
DIASTOLIC BLOOD PRESSURE: 80 MMHG | SYSTOLIC BLOOD PRESSURE: 138 MMHG | HEART RATE: 80 BPM | RESPIRATION RATE: 16 BRPM | TEMPERATURE: 99 F | HEIGHT: 70 IN | BODY MASS INDEX: 39.08 KG/M2 | WEIGHT: 273 LBS | OXYGEN SATURATION: 95 %

## 2022-05-19 DIAGNOSIS — Z00.00 MEDICARE ANNUAL WELLNESS VISIT, SUBSEQUENT: ICD-10-CM

## 2022-05-19 DIAGNOSIS — E78.00 PURE HYPERCHOLESTEROLEMIA: ICD-10-CM

## 2022-05-19 DIAGNOSIS — E66.01 SEVERE OBESITY (BMI 35.0-39.9) WITH COMORBIDITY (HCC): ICD-10-CM

## 2022-05-19 DIAGNOSIS — Z12.5 PROSTATE CANCER SCREENING: ICD-10-CM

## 2022-05-19 DIAGNOSIS — I10 HYPERTENSION, UNSPECIFIED TYPE: Primary | ICD-10-CM

## 2022-05-19 DIAGNOSIS — G47.33 OSA ON CPAP: ICD-10-CM

## 2022-05-19 DIAGNOSIS — E66.9 OBESITY (BMI 30-39.9): ICD-10-CM

## 2022-05-19 DIAGNOSIS — Z99.89 OSA ON CPAP: ICD-10-CM

## 2022-05-19 DIAGNOSIS — J45.909 UNCOMPLICATED ASTHMA, UNSPECIFIED ASTHMA SEVERITY, UNSPECIFIED WHETHER PERSISTENT: ICD-10-CM

## 2022-05-19 PROCEDURE — G8427 DOCREV CUR MEDS BY ELIG CLIN: HCPCS | Performed by: INTERNAL MEDICINE

## 2022-05-19 PROCEDURE — G8752 SYS BP LESS 140: HCPCS | Performed by: INTERNAL MEDICINE

## 2022-05-19 PROCEDURE — G8510 SCR DEP NEG, NO PLAN REQD: HCPCS | Performed by: INTERNAL MEDICINE

## 2022-05-19 PROCEDURE — G0463 HOSPITAL OUTPT CLINIC VISIT: HCPCS | Performed by: INTERNAL MEDICINE

## 2022-05-19 PROCEDURE — G0439 PPPS, SUBSEQ VISIT: HCPCS | Performed by: INTERNAL MEDICINE

## 2022-05-19 PROCEDURE — 1101F PT FALLS ASSESS-DOCD LE1/YR: CPT | Performed by: INTERNAL MEDICINE

## 2022-05-19 PROCEDURE — 3017F COLORECTAL CA SCREEN DOC REV: CPT | Performed by: INTERNAL MEDICINE

## 2022-05-19 PROCEDURE — G8754 DIAS BP LESS 90: HCPCS | Performed by: INTERNAL MEDICINE

## 2022-05-19 PROCEDURE — 99213 OFFICE O/P EST LOW 20 MIN: CPT | Performed by: INTERNAL MEDICINE

## 2022-05-19 PROCEDURE — G8536 NO DOC ELDER MAL SCRN: HCPCS | Performed by: INTERNAL MEDICINE

## 2022-05-19 PROCEDURE — G8417 CALC BMI ABV UP PARAM F/U: HCPCS | Performed by: INTERNAL MEDICINE

## 2022-05-19 NOTE — PATIENT INSTRUCTIONS
Medicare Wellness Visit, Male    The best way to live healthy is to have a lifestyle where you eat a well-balanced diet, exercise regularly, limit alcohol use, and quit all forms of tobacco/nicotine, if applicable. Regular preventive services are another way to keep healthy. Preventive services (vaccines, screening tests, monitoring & exams) can help personalize your care plan, which helps you manage your own care. Screening tests can find health problems at the earliest stages, when they are easiest to treat. [facility] follows the current, evidence-based guidelines published by the [facility] ([facility]) when recommending preventive services for our patients. Because we follow these guidelines, sometimes recommendations change over time as research supports it. (For example, a prostate screening blood test is no longer routinely recommended for men with no symptoms). Of course, you and your doctor may decide to screen more often for some diseases, based on your risk and co-morbidities (chronic disease you are already diagnosed with). Preventive services for you include:  - Medicare offers their members a free annual wellness visit, which is time for you and your primary care provider to discuss and plan for your preventive service needs. Take advantage of this benefit every year!  -All adults over age 72 should receive the recommended pneumonia vaccines. Current USPSTF guidelines recommend a series of two vaccines for the best pneumonia protection.   -All adults should have a flu vaccine yearly and tetanus vaccine every 10 years.  -All adults age 48 and older should receive the shingles vaccines (series of two vaccines).        -All adults age 38-68 who are overweight should have a diabetes screening test once every three years.   -Other screening tests & preventive services for persons with diabetes include: an eye exam to screen for diabetic retinopathy, a kidney function test, a foot exam, and stricter control over your cholesterol.   -Cardiovascular screening for adults with routine risk involves an electrocardiogram (ECG) at intervals determined by the provider.   -Colorectal cancer screening should be done for adults age 54-65 with no increased risk factors for colorectal cancer. There are a number of acceptable methods of screening for this type of cancer. Each test has its own benefits and drawbacks. Discuss with your provider what is most appropriate for you during your annual wellness visit. The different tests include: colonoscopy (considered the best screening method), a fecal occult blood test, a fecal DNA test, and sigmoidoscopy.  -All adults born between [facility] should be screened once for Hepatitis C.  -An Abdominal Aortic Aneurysm (AAA) Screening is recommended for men age 73-68 who has ever smoked in their lifetime.      Here is a list of your current Health Maintenance items (your personalized list of preventive services) with a due date:  Health Maintenance Due   Topic Date Due    Depresssion Screening  04/28/2022

## 2022-05-19 NOTE — PROGRESS NOTES
1. \"Have you been to the ER, urgent care clinic since your last visit? Hospitalized since your last visit? \"  No  2. \"Have you seen or consulted any other health care providers outside of the [address] [place] [address] since your last visit? \" Yes, Lizette Meneses    3. For patients aged 39-70: Has the patient had a colonoscopy / FIT/ Cologuard?  Yes, recently done this year,repeat 2 years, Noted in chart

## 2022-05-20 LAB
ALBUMIN SERPL-MCNC: 3.5 G/DL (ref 3.5–5)
ALBUMIN/GLOB SERPL: 0.8 {RATIO} (ref 1.1–2.2)
ALP SERPL-CCNC: 71 U/L (ref 45–117)
ALT SERPL-CCNC: 22 U/L (ref 12–78)
ANION GAP SERPL CALC-SCNC: 4 MMOL/L (ref 5–15)
AST SERPL-CCNC: 16 U/L (ref 15–37)
BILIRUB SERPL-MCNC: 0.5 MG/DL (ref 0.2–1)
BUN SERPL-MCNC: 18 MG/DL (ref 6–20)
BUN/CREAT SERPL: 19 (ref 12–20)
CALCIUM SERPL-MCNC: 8.6 MG/DL (ref 8.5–10.1)
CHLORIDE SERPL-SCNC: 106 MMOL/L (ref 97–108)
CHOLEST SERPL-MCNC: 159 MG/DL
CO2 SERPL-SCNC: 27 MMOL/L (ref 21–32)
CREAT SERPL-MCNC: 0.96 MG/DL (ref 0.7–1.3)
GLOBULIN SER CALC-MCNC: 4.3 G/DL (ref 2–4)
GLUCOSE SERPL-MCNC: 95 MG/DL (ref 65–100)
HDLC SERPL-MCNC: 39 MG/DL
HDLC SERPL: 4.1 {RATIO} (ref 0–5)
LDLC SERPL CALC-MCNC: 108.8 MG/DL (ref 0–100)
POTASSIUM SERPL-SCNC: 4 MMOL/L (ref 3.5–5.1)
PROT SERPL-MCNC: 7.8 G/DL (ref 6.4–8.2)
PSA SERPL-MCNC: 0.5 NG/ML (ref 0.01–4)
SODIUM SERPL-SCNC: 137 MMOL/L (ref 136–145)
TRIGL SERPL-MCNC: 56 MG/DL (ref ?–150)
VLDLC SERPL CALC-MCNC: 11.2 MG/DL

## 2022-11-16 RX ORDER — ATORVASTATIN CALCIUM 10 MG/1
TABLET, FILM COATED ORAL
Qty: 90 TABLET | Refills: 3 | Status: SHIPPED | OUTPATIENT
Start: 2022-11-16

## 2022-11-16 RX ORDER — OLMESARTAN MEDOXOMIL AND HYDROCHLOROTHIAZIDE 20/12.5 20; 12.5 MG/1; MG/1
TABLET ORAL
Qty: 90 TABLET | Refills: 3 | Status: SHIPPED | OUTPATIENT
Start: 2022-11-16

## 2022-11-23 ENCOUNTER — OFFICE VISIT (OUTPATIENT)
Dept: INTERNAL MEDICINE CLINIC | Age: 67
End: 2022-11-23
Payer: MEDICARE

## 2022-11-23 VITALS
OXYGEN SATURATION: 94 % | HEIGHT: 70 IN | WEIGHT: 273 LBS | DIASTOLIC BLOOD PRESSURE: 62 MMHG | HEART RATE: 65 BPM | RESPIRATION RATE: 18 BRPM | SYSTOLIC BLOOD PRESSURE: 134 MMHG | BODY MASS INDEX: 39.08 KG/M2 | TEMPERATURE: 97 F

## 2022-11-23 DIAGNOSIS — I10 HYPERTENSION, UNSPECIFIED TYPE: Primary | ICD-10-CM

## 2022-11-23 DIAGNOSIS — L98.9 SKIN LESION OF RIGHT LEG: ICD-10-CM

## 2022-11-23 DIAGNOSIS — E78.5 HYPERLIPIDEMIA, UNSPECIFIED HYPERLIPIDEMIA TYPE: ICD-10-CM

## 2022-11-23 DIAGNOSIS — J45.909 UNCOMPLICATED ASTHMA, UNSPECIFIED ASTHMA SEVERITY, UNSPECIFIED WHETHER PERSISTENT: ICD-10-CM

## 2022-11-23 LAB
ALBUMIN SERPL-MCNC: 3.6 G/DL (ref 3.5–5)
ALBUMIN/GLOB SERPL: 0.8 {RATIO} (ref 1.1–2.2)
ALP SERPL-CCNC: 56 U/L (ref 45–117)
ALT SERPL-CCNC: 24 U/L (ref 12–78)
ANION GAP SERPL CALC-SCNC: 3 MMOL/L (ref 5–15)
AST SERPL-CCNC: 15 U/L (ref 15–37)
BILIRUB SERPL-MCNC: 0.4 MG/DL (ref 0.2–1)
BUN SERPL-MCNC: 18 MG/DL (ref 6–20)
BUN/CREAT SERPL: 15 (ref 12–20)
CALCIUM SERPL-MCNC: 9.3 MG/DL (ref 8.5–10.1)
CHLORIDE SERPL-SCNC: 104 MMOL/L (ref 97–108)
CO2 SERPL-SCNC: 31 MMOL/L (ref 21–32)
CREAT SERPL-MCNC: 1.22 MG/DL (ref 0.7–1.3)
ERYTHROCYTE [DISTWIDTH] IN BLOOD BY AUTOMATED COUNT: 14.6 % (ref 11.5–14.5)
GLOBULIN SER CALC-MCNC: 4.6 G/DL (ref 2–4)
GLUCOSE SERPL-MCNC: 98 MG/DL (ref 65–100)
HCT VFR BLD AUTO: 45.1 % (ref 36.6–50.3)
HGB BLD-MCNC: 14.6 G/DL (ref 12.1–17)
MCH RBC QN AUTO: 29.8 PG (ref 26–34)
MCHC RBC AUTO-ENTMCNC: 32.4 G/DL (ref 30–36.5)
MCV RBC AUTO: 92 FL (ref 80–99)
NRBC # BLD: 0 K/UL (ref 0–0.01)
NRBC BLD-RTO: 0 PER 100 WBC
PLATELET # BLD AUTO: 244 K/UL (ref 150–400)
PMV BLD AUTO: 11 FL (ref 8.9–12.9)
POTASSIUM SERPL-SCNC: 4.1 MMOL/L (ref 3.5–5.1)
PROT SERPL-MCNC: 8.2 G/DL (ref 6.4–8.2)
RBC # BLD AUTO: 4.9 M/UL (ref 4.1–5.7)
SODIUM SERPL-SCNC: 138 MMOL/L (ref 136–145)
WBC # BLD AUTO: 6.5 K/UL (ref 4.1–11.1)

## 2022-11-23 PROCEDURE — G8510 SCR DEP NEG, NO PLAN REQD: HCPCS | Performed by: INTERNAL MEDICINE

## 2022-11-23 PROCEDURE — 99213 OFFICE O/P EST LOW 20 MIN: CPT | Performed by: INTERNAL MEDICINE

## 2022-11-23 PROCEDURE — G8417 CALC BMI ABV UP PARAM F/U: HCPCS | Performed by: INTERNAL MEDICINE

## 2022-11-23 PROCEDURE — G8752 SYS BP LESS 140: HCPCS | Performed by: INTERNAL MEDICINE

## 2022-11-23 PROCEDURE — G8536 NO DOC ELDER MAL SCRN: HCPCS | Performed by: INTERNAL MEDICINE

## 2022-11-23 PROCEDURE — 3017F COLORECTAL CA SCREEN DOC REV: CPT | Performed by: INTERNAL MEDICINE

## 2022-11-23 PROCEDURE — G0463 HOSPITAL OUTPT CLINIC VISIT: HCPCS | Performed by: INTERNAL MEDICINE

## 2022-11-23 PROCEDURE — 1101F PT FALLS ASSESS-DOCD LE1/YR: CPT | Performed by: INTERNAL MEDICINE

## 2022-11-23 PROCEDURE — G8754 DIAS BP LESS 90: HCPCS | Performed by: INTERNAL MEDICINE

## 2022-11-23 PROCEDURE — G8427 DOCREV CUR MEDS BY ELIG CLIN: HCPCS | Performed by: INTERNAL MEDICINE

## 2022-11-23 RX ORDER — ANASTROZOLE 1 MG/1
1 TABLET ORAL
COMMUNITY
Start: 2022-09-04

## 2022-11-23 RX ORDER — CLOMIPHENE CITRATE 50 MG/1
50 TABLET ORAL DAILY
COMMUNITY

## 2022-11-23 NOTE — PROGRESS NOTES
Elizabeth Bethea is a 74 y.o. male  Chief Complaint   Patient presents with    Hypertension     1. Have you been to the ER, no urgent care clinic since your last visit? no Hospitalized since your last visit? no    2. Have you seen or consulted any other health care providers outside of the [address] [place] [address] since your last visit? yes Include any pap smears or colon screening.  no

## 2022-11-23 NOTE — PROGRESS NOTES
HISTORY OF PRESENT ILLNESS  Jersey Lyles is a 74 y.o. male. HPI  F/u htn hld eosinophilic asthma obesity, rosendo on cpap , hx colon polyps  Asthma has been controlled on xolair  On clomiphene 3d /weel for low T and on arimedex per Dr Aaron Wheeler less fatigue. PSA ok per pt    C/ dark rash on RLE x 3 years but getting larger  Last OV  Weight--up 6 lbs but weight down recently per pt  Asthma symptoms controlled on [place]. On cpap  Sees Dr Bina Manley fro [facility] ED s/p penile implant and low testosterone--PSA per [facility] MD. testoserone level was ok per pt--PSA may not have been logged  Had colonoscopy last month-polyps removed--Dr Phillip Kapoor    Patient Active Problem List    Diagnosis Date Noted    ROSENDO (obstructive sleep apnea) 10/23/2019    Pure hypercholesterolemia 10/11/2018    Situational stress 12/15/2017    Essential hypertension 01/26/2017    Moderate persistent asthma without complication 91/10/1115    Sepsis ([address] 04/29/2016    Pneumonia 04/29/2016    Right knee DJD     Nephrolithiasis     Colon polyp     Ventral hernia 12/05/2012    Seasonal allergic rhinitis 10/03/2012    Male hypogonadism 11/11/2011    ED (erectile dysfunction)      Current Outpatient Medications   Medication Sig Dispense Refill    olmesartan-hydroCHLOROthiazide (BENICAR HCT) 20-12.5 mg per tablet TAKE 1 TABLET BY MOUTH DAILY 90 Tablet 3    atorvastatin (LIPITOR) 10 mg tablet TAKE 1 TABLET BY MOUTH DAILY 90 Tablet 3    prednisoLONE acetate (PRED FORTE) 1 % ophthalmic suspension       cyanocobalamin (Vitamin B-12) 1,000 mcg/mL injection 1,000 mcg by IntraMUSCular route once. cholecalciferol (VITAMIN D3) (5000 Units/125 mcg) tab tablet Take 5,000 Units by mouth two (2) times a day. ascorbic acid, vitamin C, (Vitamin C) 500 mg tablet Take 1,000 mg by mouth. Flaxseed Oil oil by Does Not Apply route. fluticasone furoate-vilanterol (BREO ELLIPTA) 200-25 mcg/dose inhaler Take 1 Puff by inhalation daily.       omalizumab Abram Harris SC) by SubCUTAneous route. 225 mg injection every 2 weeks      cpap machine kit by Does Not Apply route. fluticasone propionate (FLONASE) 50 mcg/actuation nasal spray USE 2 SPRAYS IN EACH NOSTRIL DAILY 3 Bottle 3    montelukast (SINGULAIR) 10 mg tablet TAKE 1 TABLET BY MOUTH DAILY 90 Tab 3    B.infantis-B.ani-B.long-B.bifi (PROBIOTIC 4X) 10-15 mg TbEC Take  by mouth. albuterol (VENTOLIN HFA) 90 mcg/actuation inhaler Take 2 puffs by inhalation every four (4) hours as needed for Wheezing. 1 Inhaler 5    multivitamin, stress formula (STRESS TAB) tablet Take 1 Tab by mouth daily. Allergies   Allergen Reactions    Iodine Hives     IVP contrast/seafood      Lab Results   Component Value Date/Time    WBC 6.6 11/18/2021 10:25 AM    HGB 13.7 11/18/2021 10:25 AM    HCT 42.5 11/18/2021 10:25 AM    PLATELET 047 90/10/7157 10:25 AM    MCV 91.8 11/18/2021 10:25 AM     Lab Results   Component Value Date/Time    Hemoglobin A1c 5.8 (H) 11/11/2011 08:54 AM    Glucose 95 05/19/2022 09:51 AM    Microalb/Creat ratio (ug/mg creat.) 11.4 08/26/2014 09:25 AM    LDL, calculated 108.8 (H) 05/19/2022 09:51 AM    Creatinine 0.96 05/19/2022 09:51 AM      Lab Results   Component Value Date/Time    Cholesterol, total 159 05/19/2022 09:51 AM    HDL Cholesterol 39 05/19/2022 09:51 AM    LDL, calculated 108.8 (H) 05/19/2022 09:51 AM    Triglyceride 56 05/19/2022 09:51 AM    CHOL/HDL Ratio 4.1 05/19/2022 09:51 AM     Lab Results   Component Value Date/Time    GFR est non-AA >60 05/19/2022 09:51 AM    GFR est AA >60 05/19/2022 09:51 AM    Creatinine 0.96 05/19/2022 09:51 AM    BUN 18 05/19/2022 09:51 AM    Sodium 137 05/19/2022 09:51 AM    Potassium 4.0 05/19/2022 09:51 AM    Chloride 106 05/19/2022 09:51 AM    CO2 27 05/19/2022 09:51 AM        ROS    Physical Exam  Vitals and nursing note reviewed. Constitutional:       General: He is not in acute distress. Appearance: Normal appearance. He is well-developed.       Comments: Appears stated age   HENT:      Head: Normocephalic. Cardiovascular:      Rate and Rhythm: Normal rate and regular rhythm. Heart sounds: Normal heart sounds. Pulmonary:      Effort: Pulmonary effort is normal.      Breath sounds: Normal breath sounds. Abdominal:      Palpations: Abdomen is soft. Skin:         Neurological:      Mental Status: He is alert. ASSESSMENT and PLAN    ICD-10-CM ICD-9-CM    1. Hypertension, unspecified type  I10 401.9 CBC W/O DIFF      METABOLIC PANEL, COMPREHENSIVE      CBC W/O DIFF      METABOLIC PANEL, COMPREHENSIVE      CBC W/O DIFF      METABOLIC PANEL, COMPREHENSIVE  Controlled  Recommended weight reduction      2. Hyperlipidemia, unspecified hyperlipidemia type  X89.4 115.8 METABOLIC PANEL, COMPREHENSIVE      METABOLIC PANEL, COMPREHENSIVE      METABOLIC PANEL, COMPREHENSIVE      3. Uncomplicated asthma, unspecified asthma severity, unspecified whether persistent  J45.909 493.90 Good control on xolair      4.  Skin lesion of right leg  L98.9 709.9 REFERRAL TO DERMATOLOGY      REFERRAL TO DERMATOLOGY  C/w seb K but enlarging x 3 years

## 2022-12-13 ENCOUNTER — OFFICE VISIT (OUTPATIENT)
Dept: INTERNAL MEDICINE CLINIC | Age: 67
End: 2022-12-13
Payer: MEDICARE

## 2022-12-13 VITALS
HEART RATE: 79 BPM | HEIGHT: 70 IN | WEIGHT: 273 LBS | DIASTOLIC BLOOD PRESSURE: 62 MMHG | TEMPERATURE: 98.6 F | SYSTOLIC BLOOD PRESSURE: 130 MMHG | BODY MASS INDEX: 39.08 KG/M2 | RESPIRATION RATE: 16 BRPM | OXYGEN SATURATION: 96 %

## 2022-12-13 DIAGNOSIS — R10.32 LEFT LOWER QUADRANT ABDOMINAL PAIN: Primary | ICD-10-CM

## 2022-12-13 PROCEDURE — G8510 SCR DEP NEG, NO PLAN REQD: HCPCS | Performed by: STUDENT IN AN ORGANIZED HEALTH CARE EDUCATION/TRAINING PROGRAM

## 2022-12-13 PROCEDURE — G0463 HOSPITAL OUTPT CLINIC VISIT: HCPCS | Performed by: STUDENT IN AN ORGANIZED HEALTH CARE EDUCATION/TRAINING PROGRAM

## 2022-12-13 PROCEDURE — 99213 OFFICE O/P EST LOW 20 MIN: CPT | Performed by: STUDENT IN AN ORGANIZED HEALTH CARE EDUCATION/TRAINING PROGRAM

## 2022-12-13 PROCEDURE — 3017F COLORECTAL CA SCREEN DOC REV: CPT | Performed by: STUDENT IN AN ORGANIZED HEALTH CARE EDUCATION/TRAINING PROGRAM

## 2022-12-13 PROCEDURE — 1101F PT FALLS ASSESS-DOCD LE1/YR: CPT | Performed by: STUDENT IN AN ORGANIZED HEALTH CARE EDUCATION/TRAINING PROGRAM

## 2022-12-13 PROCEDURE — G8754 DIAS BP LESS 90: HCPCS | Performed by: STUDENT IN AN ORGANIZED HEALTH CARE EDUCATION/TRAINING PROGRAM

## 2022-12-13 PROCEDURE — G8417 CALC BMI ABV UP PARAM F/U: HCPCS | Performed by: STUDENT IN AN ORGANIZED HEALTH CARE EDUCATION/TRAINING PROGRAM

## 2022-12-13 PROCEDURE — G8536 NO DOC ELDER MAL SCRN: HCPCS | Performed by: STUDENT IN AN ORGANIZED HEALTH CARE EDUCATION/TRAINING PROGRAM

## 2022-12-13 PROCEDURE — G8427 DOCREV CUR MEDS BY ELIG CLIN: HCPCS | Performed by: STUDENT IN AN ORGANIZED HEALTH CARE EDUCATION/TRAINING PROGRAM

## 2022-12-13 PROCEDURE — G8752 SYS BP LESS 140: HCPCS | Performed by: STUDENT IN AN ORGANIZED HEALTH CARE EDUCATION/TRAINING PROGRAM

## 2022-12-13 RX ORDER — OMEGA-3S/DHA/EPA/FISH OIL/D3 300MG-1000
CAPSULE ORAL
COMMUNITY

## 2022-12-13 RX ORDER — FAMOTIDINE 20 MG/1
20 TABLET, FILM COATED ORAL 2 TIMES DAILY
Qty: 30 TABLET | Refills: 0 | Status: SHIPPED | OUTPATIENT
Start: 2022-12-13

## 2022-12-13 RX ORDER — SIMETHICONE 80 MG
80 TABLET,CHEWABLE ORAL
Qty: 30 TABLET | Refills: 0 | Status: SHIPPED | OUTPATIENT
Start: 2022-12-13

## 2022-12-13 RX ORDER — BISMUTH SUBSALICYLATE 262 MG/15ML
30 LIQUID ORAL
Qty: 354 ML | Refills: 0 | Status: SHIPPED | OUTPATIENT
Start: 2022-12-13

## 2022-12-13 NOTE — PROGRESS NOTES
1. \"Have you been to the ER, urgent care clinic since your last visit? Hospitalized since your last visit? \" No    2. \"Have you seen or consulted any other health care providers outside of the [address] [place] [address] since your last visit? \" No     3. For patients aged 39-70: Has the patient had a colonoscopy / FIT/ Cologuard?  Yes - no Care Gap present

## 2022-12-13 NOTE — PROGRESS NOTES
Good Help to Those in [facility]   Internal Medicine  [address] [facility] [address], 235 [facility]  [address]  [address]  [phone]      Primary Care Visit Note    Assessment/Plan:     Diagnoses and all orders for this visit:    1. Left lower quadrant abdominal pain: possible diverticulitis given hx of diverticulosis vs. Gas pain. - symptoms resolving therefore will hold off on further workup. Patient has contrast allergy, therefore would need to go to ED for CT.   -     simethicone (MYLICON) 80 mg chewable tablet; Take 1 Tablet by mouth every six (6) hours as needed for Flatulence. -     bismuth subsalicylate (Pepto-BismoL) 262 mg/15 mL suspension; Take 30 mL by mouth every six (6) hours as needed for Indigestion.  -     famotidine (PEPCID) 20 mg tablet; Take 1 Tablet by mouth two (2) times a day. - continue soft diet, advance as tolerated. - advised to go to ED for workup if worsening. Carmela Truong MD    CC:     Chief Complaint   Patient presents with    Abdominal Pain       HPI:     Cj Freeman is a 74 y.o. male who presents for evaluation of abdominal pain. - Pt thinks he is experiencing diverticulitis. Pt's wife has had it and she is an RN. - pt's wife wanted him to go to the ED but pt refused. - Pain was peaked on Friday, 10/10. Started initially on Wed last week. Pain improved now to 1/10.   - pt had been eating a liquid diet, then switched to soft diet on Sun.   - Pt had one episode of vomiting on Friday. - pt has been having indigestion and gas. - no fevers  - pt having heart burn as well. - tomy seltzer and liudmila which helped with the pain. ROS:   All 10 point review of systems negative except those mentioned in the HPI. Past Medical History:      Active Ambulatory Problems     Diagnosis Date Noted    ED (erectile dysfunction)     Male hypogonadism 11/11/2011    Seasonal allergic rhinitis 10/03/2012    Ventral hernia 12/05/2012    Colon polyp Right knee DJD     Nephrolithiasis     Sepsis ([facility] [address] 04/29/2016    Pneumonia 04/29/2016    Essential hypertension 01/26/2017    Moderate persistent asthma without complication 27/45/5285    Situational stress 12/15/2017    Pure hypercholesterolemia 10/11/2018    ROSENDO (obstructive sleep apnea) 10/23/2019     Resolved Ambulatory Problems     Diagnosis Date Noted    Hypertension     Asthma      Past Medical History:   Diagnosis Date    Alcohol dependence ([facility] [address]     GERD (gastroesophageal reflux disease)     IBS (irritable bowel syndrome)     Positive PPD           Current Medications:     Current Outpatient Medications:     omega-3s-dha-epa-fish oil 1,000-1,400 mg cpDR, Take  by mouth., Disp: , Rfl:     simethicone (MYLICON) 80 mg chewable tablet, Take 1 Tablet by mouth every six (6) hours as needed for Flatulence. , Disp: 30 Tablet, Rfl: 0    bismuth subsalicylate (Pepto-BismoL) 262 mg/15 mL suspension, Take 30 mL by mouth every six (6) hours as needed for Indigestion. , Disp: 354 mL, Rfl: 0    famotidine (PEPCID) 20 mg tablet, Take 1 Tablet by mouth two (2) times a day., Disp: 30 Tablet, Rfl: 0    anastrozole (ARIMIDEX) 1 mg tablet, Take 1 mg by mouth every seven (7) days. , Disp: , Rfl:     clomiPHENE (CLOMID) 50 mg tablet, Take 50 mg by mouth daily. Take 3 times per week, Disp: , Rfl:     olmesartan-hydroCHLOROthiazide (BENICAR HCT) 20-12.5 mg per tablet, TAKE 1 TABLET BY MOUTH DAILY, Disp: 90 Tablet, Rfl: 3    atorvastatin (LIPITOR) 10 mg tablet, TAKE 1 TABLET BY MOUTH DAILY, Disp: 90 Tablet, Rfl: 3    prednisoLONE acetate (PRED FORTE) 1 % ophthalmic suspension, , Disp: , Rfl:     cholecalciferol (VITAMIN D3) (5000 Units/125 mcg) tab tablet, Take 5,000 Units by mouth two (2) times a day., Disp: , Rfl:     fluticasone furoate-vilanteroL (BREO ELLIPTA) 200-25 mcg/dose inhaler, Take 1 Puff by inhalation daily. , Disp: , Rfl:     omalizumab ([facility]), by SubCUTAneous route.  225 mg injection every 2 weeks, Disp: , Rfl: cpap machine kit, by Does Not Apply route., Disp: , Rfl:     fluticasone propionate (FLONASE) 50 mcg/actuation nasal spray, USE 2 SPRAYS IN EACH NOSTRIL DAILY, Disp: 3 Bottle, Rfl: 3    montelukast (SINGULAIR) 10 mg tablet, TAKE 1 TABLET BY MOUTH DAILY, Disp: 90 Tab, Rfl: 3    albuterol (VENTOLIN HFA) 90 mcg/actuation inhaler, Take 2 puffs by inhalation every four (4) hours as needed for Wheezing., Disp: 1 Inhaler, Rfl: 5    multivitamin, stress formula (STRESS TAB) tablet, Take 1 Tab by mouth daily. , Disp: , Rfl:     ascorbic acid, vitamin C, (VITAMIN C) 500 mg tablet, Take 1,000 mg by mouth., Disp: , Rfl:       Past Surgical History:     Past Surgical History:   Procedure Laterality Date    HX COLONOSCOPY  2014    due 2019    HX KNEE REPLACEMENT Left 2018    HX VASECTOMY      NC PROSTATE BIOPSY, NEEDLE, SATURATION SAMPLING      prostate bx     PROSTHESIS, PENILE, INFLATAB           Family History:     Family History   Problem Relation Age of Onset    Alcohol abuse Father     Diabetes Father     Cancer Father         larynx    Hypertension Mother     Alcohol abuse Paternal Grandmother     Diabetes Paternal Grandmother          Social History:     Social History     Socioeconomic History    Marital status:      Spouse name: Not on file    Number of children: Not on file    Years of education: Not on file    Highest education level: Not on file   Occupational History    Not on file   Tobacco Use    Smoking status: Former     Packs/day: 1.00     Years: 12.00     Pack years: 12.00     Types: Cigarettes     Quit date: 1981     Years since quittin.3    Smokeless tobacco: Never   Vaping Use    Vaping Use: Never used   Substance and Sexual Activity    Alcohol use: Yes     Comment: Seldom    Drug use: Yes     Types: Prescription, OTC    Sexual activity: Yes     Partners: Female   Other Topics Concern    Not on file   Social History Narrative    Not on file     Social Determinants of Health Financial Resource Strain: Low Risk     Difficulty of Paying Living Expenses: Not hard at all   Food Insecurity: No Food Insecurity    Worried About Running Out of Food in the Last Year: Never true    Ran Out of Food in the Last Year: Never true   Transportation Needs: Not on file   Physical Activity: Not on file   Stress: Not on file   Social Connections: Not on file   Intimate Partner Violence: Not on file   Housing Stability: Not on file            Visit Vitals  /62   Pulse 79   Temp 98.6 °F (37 °C) (Tympanic)   Resp 16   Ht 5' 10\" (1.778 m)   Wt 273 lb (123.8 kg)   SpO2 96%   BMI 39.17 kg/m²       Physical Exam:     Physical Exam  Constitutional:       General: He is not in acute distress. Appearance: Normal appearance. He is obese. He is not toxic-appearing. HENT:      Head: Normocephalic and atraumatic. Eyes:      General: No scleral icterus. Extraocular Movements: Extraocular movements intact. Cardiovascular:      Rate and Rhythm: Normal rate and regular rhythm. Heart sounds: No murmur heard. Pulmonary:      Effort: Pulmonary effort is normal.      Breath sounds: Normal breath sounds. Abdominal:      General: Bowel sounds are normal.      Palpations: Abdomen is soft. Tenderness: There is abdominal tenderness in the left lower quadrant. There is no guarding. Neurological:      Mental Status: He is alert.        Labs/Imaging:     Labs and imaging reviewed by me and significant for:  Lab Results   Component Value Date/Time    WBC 6.5 11/23/2022 10:26 AM    HGB 14.6 11/23/2022 10:26 AM    HCT 45.1 11/23/2022 10:26 AM    PLATELET 908 18/26/5493 10:26 AM    MCV 92.0 11/23/2022 10:26 AM     Lab Results   Component Value Date/Time    Sodium 138 11/23/2022 10:26 AM    Potassium 4.1 11/23/2022 10:26 AM    Chloride 104 11/23/2022 10:26 AM    CO2 31 11/23/2022 10:26 AM    Anion gap 3 (L) 11/23/2022 10:26 AM    Glucose 98 11/23/2022 10:26 AM    BUN 18 11/23/2022 10:26 AM    Creatinine 1.22 11/23/2022 10:26 AM    BUN/Creatinine ratio 15 11/23/2022 10:26 AM    GFR est AA >60 05/19/2022 09:51 AM    GFR est non-AA >60 05/19/2022 09:51 AM    Calcium 9.3 11/23/2022 10:26 AM    Bilirubin, total 0.4 11/23/2022 10:26 AM    Alk.  phosphatase 56 11/23/2022 10:26 AM    Protein, total 8.2 11/23/2022 10:26 AM    Albumin 3.6 11/23/2022 10:26 AM    Globulin 4.6 (H) 11/23/2022 10:26 AM    A-G Ratio 0.8 (L) 11/23/2022 10:26 AM    ALT (SGPT) 24 11/23/2022 10:26 AM    AST (SGOT) 15 11/23/2022 10:26 AM     Lab Results   Component Value Date/Time    Hemoglobin A1c 5.8 (H) 11/11/2011 08:54 AM

## 2023-06-08 ENCOUNTER — TELEPHONE (OUTPATIENT)
Age: 68
End: 2023-06-08

## 2023-06-08 ENCOUNTER — OFFICE VISIT (OUTPATIENT)
Age: 68
End: 2023-06-08
Payer: MEDICARE

## 2023-06-08 VITALS
HEIGHT: 70 IN | RESPIRATION RATE: 16 BRPM | HEART RATE: 77 BPM | WEIGHT: 272.2 LBS | TEMPERATURE: 97.1 F | BODY MASS INDEX: 38.97 KG/M2 | OXYGEN SATURATION: 97 % | DIASTOLIC BLOOD PRESSURE: 70 MMHG | SYSTOLIC BLOOD PRESSURE: 138 MMHG

## 2023-06-08 DIAGNOSIS — R05.1 ACUTE COUGH: Primary | ICD-10-CM

## 2023-06-08 DIAGNOSIS — Z12.5 PROSTATE CANCER SCREENING: ICD-10-CM

## 2023-06-08 DIAGNOSIS — J45.41 MODERATE PERSISTENT ASTHMA WITH EXACERBATION: ICD-10-CM

## 2023-06-08 DIAGNOSIS — E78.5 HYPERLIPIDEMIA, UNSPECIFIED HYPERLIPIDEMIA TYPE: ICD-10-CM

## 2023-06-08 DIAGNOSIS — I10 HYPERTENSION, UNSPECIFIED TYPE: ICD-10-CM

## 2023-06-08 DIAGNOSIS — E66.01 SEVERE OBESITY (BMI 35.0-39.9) WITH COMORBIDITY (HCC): ICD-10-CM

## 2023-06-08 PROCEDURE — G8417 CALC BMI ABV UP PARAM F/U: HCPCS | Performed by: INTERNAL MEDICINE

## 2023-06-08 PROCEDURE — 99213 OFFICE O/P EST LOW 20 MIN: CPT | Performed by: INTERNAL MEDICINE

## 2023-06-08 PROCEDURE — 4004F PT TOBACCO SCREEN RCVD TLK: CPT | Performed by: INTERNAL MEDICINE

## 2023-06-08 PROCEDURE — 3075F SYST BP GE 130 - 139MM HG: CPT | Performed by: INTERNAL MEDICINE

## 2023-06-08 PROCEDURE — 3017F COLORECTAL CA SCREEN DOC REV: CPT | Performed by: INTERNAL MEDICINE

## 2023-06-08 PROCEDURE — 3078F DIAST BP <80 MM HG: CPT | Performed by: INTERNAL MEDICINE

## 2023-06-08 PROCEDURE — G0439 PPPS, SUBSEQ VISIT: HCPCS | Performed by: INTERNAL MEDICINE

## 2023-06-08 PROCEDURE — 1123F ACP DISCUSS/DSCN MKR DOCD: CPT | Performed by: INTERNAL MEDICINE

## 2023-06-08 PROCEDURE — G8427 DOCREV CUR MEDS BY ELIG CLIN: HCPCS | Performed by: INTERNAL MEDICINE

## 2023-06-08 RX ORDER — METHYLPREDNISOLONE 4 MG/1
TABLET ORAL
Qty: 1 KIT | Refills: 0 | Status: CANCELLED | OUTPATIENT
Start: 2023-06-08 | End: 2023-06-14

## 2023-06-08 RX ORDER — METHYLPREDNISOLONE 4 MG/1
TABLET ORAL
Qty: 1 KIT | Refills: 0 | Status: SHIPPED | OUTPATIENT
Start: 2023-06-08 | End: 2023-06-14

## 2023-06-08 RX ORDER — CHLORAL HYDRATE 500 MG
CAPSULE ORAL
COMMUNITY

## 2023-06-08 SDOH — ECONOMIC STABILITY: FOOD INSECURITY: WITHIN THE PAST 12 MONTHS, THE FOOD YOU BOUGHT JUST DIDN'T LAST AND YOU DIDN'T HAVE MONEY TO GET MORE.: NEVER TRUE

## 2023-06-08 SDOH — ECONOMIC STABILITY: HOUSING INSECURITY
IN THE LAST 12 MONTHS, WAS THERE A TIME WHEN YOU DID NOT HAVE A STEADY PLACE TO SLEEP OR SLEPT IN A SHELTER (INCLUDING NOW)?: NO

## 2023-06-08 SDOH — ECONOMIC STABILITY: INCOME INSECURITY: HOW HARD IS IT FOR YOU TO PAY FOR THE VERY BASICS LIKE FOOD, HOUSING, MEDICAL CARE, AND HEATING?: NOT HARD AT ALL

## 2023-06-08 SDOH — ECONOMIC STABILITY: FOOD INSECURITY: WITHIN THE PAST 12 MONTHS, YOU WORRIED THAT YOUR FOOD WOULD RUN OUT BEFORE YOU GOT MONEY TO BUY MORE.: NEVER TRUE

## 2023-06-08 ASSESSMENT — PATIENT HEALTH QUESTIONNAIRE - PHQ9
SUM OF ALL RESPONSES TO PHQ QUESTIONS 1-9: 0
1. LITTLE INTEREST OR PLEASURE IN DOING THINGS: 0
2. FEELING DOWN, DEPRESSED OR HOPELESS: 0
SUM OF ALL RESPONSES TO PHQ9 QUESTIONS 1 & 2: 0

## 2023-06-08 ASSESSMENT — LIFESTYLE VARIABLES
HOW MANY STANDARD DRINKS CONTAINING ALCOHOL DO YOU HAVE ON A TYPICAL DAY: 1 OR 2
HOW OFTEN DO YOU HAVE A DRINK CONTAINING ALCOHOL: MONTHLY OR LESS

## 2023-06-08 NOTE — TELEPHONE ENCOUNTER
Patient called and stays he is having SOB. He states that as long as he was sitting down he is ok. When he moves around it is worse. Patient will go  medication and come to his appt at 2 this evening,.

## 2023-06-08 NOTE — PROGRESS NOTES
1. \"Have you been to the ER, urgent care clinic since your last visit? Hospitalized since your last visit? \" No    2. \"Have you seen or consulted any other health care providers outside of the [address] [place] [address] since your last visit? \" No     3. For patients aged 39-70: Has the patient had a colonoscopy / FIT/ Cologuard? No      If the patient is female:    4. For patients aged 41-77: Has the patient had a mammogram within the past 2 years? No      5. For patients aged 21-65: Has the patient had a pap smear?   No
normal.      Nose: Nose normal.      Mouth/Throat:      Mouth: Mucous membranes are moist.   Eyes:      Pupils: Pupils are equal, round, and reactive to light. Cardiovascular:      Rate and Rhythm: Normal rate and regular rhythm. Pulses: Normal pulses. Heart sounds: Normal heart sounds. Pulmonary:      Effort: Pulmonary effort is normal.   Abdominal:      General: Abdomen is flat. Musculoskeletal:         General: Normal range of motion. Cervical back: Normal range of motion and neck supple. Skin:     General: Skin is warm. Neurological:      General: No focal deficit present. Mental Status: He is alert. Psychiatric:         Mood and Affect: Mood normal.         Thought Content: Thought content normal.         Judgment: Judgment normal.        ASSESSMENT and PLAN  Diagnoses and all orders for this visit:    Acute cough         Hieu Kirk MD Medicare Annual Wellness Visit    Tj James is here for Medicare Meloaleena Dixon was seen today for medicare awv. Diagnoses and all orders for this visit:    Acute cough   Medrol bisi  Hypertension, unspecified type  -     Comprehensive Metabolic Panel; Future   Reasonable control  Hyperlipidemia, unspecified hyperlipidemia type  -     Lipid Panel; Future  -     Comprehensive Metabolic Panel; Future   On statin  Moderate persistent asthma with exacerbation   Medrol bisi    Wear mask next few days when outside   Continue breo and albuterol prn  Prostate cancer screening   Per URO MD  Severe obesity (BMI 35.0-39. 9) with comorbidity (HCC)       Assessment & Plan   Acute cough  Hypertension, unspecified type  Hyperlipidemia, unspecified hyperlipidemia type  Moderate persistent asthma with exacerbation  Prostate cancer screening  Severe obesity (BMI 35.0-39. 9) with comorbidity ([address]    Recommendations for Preventive Services Due: see orders and patient instructions/AVS.  Recommended screening schedule for the next 5-10 years is provided to the patient in

## 2023-06-08 NOTE — PATIENT INSTRUCTIONS
Shortness of breath.     Pain, pressure, or a strange feeling in the back, neck, jaw, or upper belly or in one or both shoulders or arms.     Lightheadedness or sudden weakness.     A fast or irregular heartbeat. After you call 911, the  may tell you to chew 1 adult-strength or 2 to 4 low-dose aspirin. Wait for an ambulance. Do not try to drive yourself. Watch closely for changes in your health, and be sure to contact your doctor if you have any problems. Where can you learn more? Go to http://www.bah.com/ and enter F075 to learn more about \"A Healthy Heart: Care Instructions. \"  Current as of: September 7, 2022               Content Version: 13.6  © 2006-2023 Healthwise, [employer]. Care instructions adapted under license by [facility] ([facility]). If you have questions about a medical condition or this instruction, always ask your healthcare professional. [place] [address] any warranty or liability for your use of this information. Personalized Preventive Plan for [facility] - 6/8/2023  Medicare offers a range of preventive health benefits. Some of the tests and screenings are paid in full while other may be subject to a deductible, co-insurance, and/or copay. Some of these benefits include a comprehensive review of your medical history including lifestyle, illnesses that may run in your family, and various assessments and screenings as appropriate. After reviewing your medical record and screening and assessments performed today your provider may have ordered immunizations, labs, imaging, and/or referrals for you. A list of these orders (if applicable) as well as your Preventive Care list are included within your After Visit Summary for your review. Other Preventive Recommendations:    A preventive eye exam performed by an eye specialist is recommended every 1-2 years to screen for glaucoma; cataracts, macular degeneration, and other eye disorders.   A

## 2023-06-09 LAB
ALBUMIN SERPL-MCNC: 3.5 G/DL (ref 3.5–5)
ALBUMIN/GLOB SERPL: 0.8 (ref 1.1–2.2)
ALP SERPL-CCNC: 50 U/L (ref 45–117)
ALT SERPL-CCNC: 26 U/L (ref 12–78)
ANION GAP SERPL CALC-SCNC: 7 MMOL/L (ref 5–15)
AST SERPL-CCNC: 17 U/L (ref 15–37)
BILIRUB SERPL-MCNC: 0.3 MG/DL (ref 0.2–1)
BUN SERPL-MCNC: 18 MG/DL (ref 6–20)
BUN/CREAT SERPL: 16 (ref 12–20)
CALCIUM SERPL-MCNC: 9.5 MG/DL (ref 8.5–10.1)
CHLORIDE SERPL-SCNC: 106 MMOL/L (ref 97–108)
CHOLEST SERPL-MCNC: 144 MG/DL
CO2 SERPL-SCNC: 28 MMOL/L (ref 21–32)
CREAT SERPL-MCNC: 1.12 MG/DL (ref 0.7–1.3)
GLOBULIN SER CALC-MCNC: 4.4 G/DL (ref 2–4)
GLUCOSE SERPL-MCNC: 102 MG/DL (ref 65–100)
HDLC SERPL-MCNC: 38 MG/DL
HDLC SERPL: 3.8 (ref 0–5)
LDLC SERPL CALC-MCNC: 98.4 MG/DL (ref 0–100)
POTASSIUM SERPL-SCNC: 4 MMOL/L (ref 3.5–5.1)
PROT SERPL-MCNC: 7.9 G/DL (ref 6.4–8.2)
SODIUM SERPL-SCNC: 141 MMOL/L (ref 136–145)
TRIGL SERPL-MCNC: 38 MG/DL
VLDLC SERPL CALC-MCNC: 7.6 MG/DL

## 2023-10-06 ENCOUNTER — TELEPHONE (OUTPATIENT)
Age: 68
End: 2023-10-06

## 2023-10-06 NOTE — TELEPHONE ENCOUNTER
Patient states he thinks there was a Scam type of call made to him Wednesday & they had all his information from [facility] & advised of Test Available. Patient gave PCP name & they advised they were going to send form for Dr. Thomas Hurd to Sign. Patient states he is worried about this being Legitimate & is Not interested unless Dr. Thomas Hurd thinks this is ok & Necessary. Please call to advise.  Thank you

## 2023-11-16 RX ORDER — OLMESARTAN MEDOXOMIL AND HYDROCHLOROTHIAZIDE 20/12.5 20; 12.5 MG/1; MG/1
1 TABLET ORAL DAILY
Qty: 90 TABLET | Refills: 3 | Status: SHIPPED | OUTPATIENT
Start: 2023-11-16

## 2023-11-16 NOTE — TELEPHONE ENCOUNTER
PCP: Fernando Andrews MD    Last appt: 6/8/2023   Future Appointments   Date Time Provider 4600  [address]   12/14/2023  9:30 AM Fernando Andrews MD [facility] BS AMB       Requested Prescriptions     Pending Prescriptions Disp Refills    olmesartan-hydroCHLOROthiazide (BENICAR HCT) 20-12.5 MG per tablet 90 tablet 0     Sig: Take 1 tablet by mouth daily

## 2023-11-27 ENCOUNTER — TELEPHONE (OUTPATIENT)
Age: 68
End: 2023-11-27

## 2023-11-27 NOTE — TELEPHONE ENCOUNTER
Attempted to reach rufina Nesbitt. Advised did not receive fax. Left detailed message on vm to resend fax to office.

## 2023-11-29 NOTE — TELEPHONE ENCOUNTER
Attempted to reach apprise Diag x2. Advised did not receive fax. Left detailed message on vm to resend fax to office.         Closing encounter

## 2023-12-14 ENCOUNTER — OFFICE VISIT (OUTPATIENT)
Age: 68
End: 2023-12-14
Payer: MEDICARE

## 2023-12-14 VITALS
RESPIRATION RATE: 18 BRPM | WEIGHT: 265.8 LBS | OXYGEN SATURATION: 98 % | TEMPERATURE: 97.2 F | SYSTOLIC BLOOD PRESSURE: 138 MMHG | HEIGHT: 70 IN | DIASTOLIC BLOOD PRESSURE: 78 MMHG | BODY MASS INDEX: 38.05 KG/M2 | HEART RATE: 74 BPM

## 2023-12-14 DIAGNOSIS — I10 HYPERTENSION, UNSPECIFIED TYPE: Primary | ICD-10-CM

## 2023-12-14 DIAGNOSIS — E78.5 HYPERLIPIDEMIA, UNSPECIFIED HYPERLIPIDEMIA TYPE: ICD-10-CM

## 2023-12-14 DIAGNOSIS — J45.30 MILD PERSISTENT ASTHMA, UNSPECIFIED WHETHER COMPLICATED: ICD-10-CM

## 2023-12-14 LAB
ANION GAP SERPL CALC-SCNC: 4 MMOL/L (ref 5–15)
BUN SERPL-MCNC: 22 MG/DL (ref 6–20)
BUN/CREAT SERPL: 20 (ref 12–20)
CALCIUM SERPL-MCNC: 9.1 MG/DL (ref 8.5–10.1)
CHLORIDE SERPL-SCNC: 105 MMOL/L (ref 97–108)
CO2 SERPL-SCNC: 30 MMOL/L (ref 21–32)
CREAT SERPL-MCNC: 1.09 MG/DL (ref 0.7–1.3)
ERYTHROCYTE [DISTWIDTH] IN BLOOD BY AUTOMATED COUNT: 14.2 % (ref 11.5–14.5)
GLUCOSE SERPL-MCNC: 97 MG/DL (ref 65–100)
HCT VFR BLD AUTO: 41.8 % (ref 36.6–50.3)
HGB BLD-MCNC: 13.6 G/DL (ref 12.1–17)
MCH RBC QN AUTO: 29.4 PG (ref 26–34)
MCHC RBC AUTO-ENTMCNC: 32.5 G/DL (ref 30–36.5)
MCV RBC AUTO: 90.5 FL (ref 80–99)
NRBC # BLD: 0 K/UL (ref 0–0.01)
NRBC BLD-RTO: 0 PER 100 WBC
PLATELET # BLD AUTO: 232 K/UL (ref 150–400)
PMV BLD AUTO: 10.6 FL (ref 8.9–12.9)
POTASSIUM SERPL-SCNC: 4.2 MMOL/L (ref 3.5–5.1)
RBC # BLD AUTO: 4.62 M/UL (ref 4.1–5.7)
SODIUM SERPL-SCNC: 139 MMOL/L (ref 136–145)
WBC # BLD AUTO: 5.7 K/UL (ref 4.1–11.1)

## 2023-12-14 PROCEDURE — G8417 CALC BMI ABV UP PARAM F/U: HCPCS | Performed by: INTERNAL MEDICINE

## 2023-12-14 PROCEDURE — G8427 DOCREV CUR MEDS BY ELIG CLIN: HCPCS | Performed by: INTERNAL MEDICINE

## 2023-12-14 PROCEDURE — 3017F COLORECTAL CA SCREEN DOC REV: CPT | Performed by: INTERNAL MEDICINE

## 2023-12-14 PROCEDURE — 99214 OFFICE O/P EST MOD 30 MIN: CPT | Performed by: INTERNAL MEDICINE

## 2023-12-14 PROCEDURE — 4004F PT TOBACCO SCREEN RCVD TLK: CPT | Performed by: INTERNAL MEDICINE

## 2023-12-14 PROCEDURE — 3078F DIAST BP <80 MM HG: CPT | Performed by: INTERNAL MEDICINE

## 2023-12-14 PROCEDURE — 1123F ACP DISCUSS/DSCN MKR DOCD: CPT | Performed by: INTERNAL MEDICINE

## 2023-12-14 PROCEDURE — 3075F SYST BP GE 130 - 139MM HG: CPT | Performed by: INTERNAL MEDICINE

## 2023-12-14 PROCEDURE — G8484 FLU IMMUNIZE NO ADMIN: HCPCS | Performed by: INTERNAL MEDICINE

## 2023-12-14 NOTE — PROGRESS NOTES
HISTORY OF PRESENT ILLNESS   Lara Burris   is a 69 y.o.  male. F/u htn hld eosinophilic asthma obesity, tim on cpap , hx colon polyps   PULM Dr Curt Escobar get shot today  URO-Dr Mahin Ramirez on clomiphene for low T -feels well  Started exercising more 6 weeks ago--exercise bike 3-4 d per week    Had RSV and Covid booster  Last LDL 98  Home BP wnl  Last OV     Increased asthma symptoms today with cough and wheeze and SOB possibly from [facility] wildfire particles--medrol sent in earlier today  Home BP-up today after inhalers  PULM Dr Ceci Garza Morrow wnl per pt     [facility] for exercise  Plays for golf weekly, yard work     Patient Active Problem List    Diagnosis Date Noted    Severe obesity (BMI 35.0-39. 9) with comorbidity ([address]) 06/08/2023    TIM (obstructive sleep apnea) 10/23/2019    Pure hypercholesterolemia 10/11/2018    Situational stress 12/15/2017    Moderate persistent asthma without complication 29/69/4281    Essential hypertension 01/26/2017    Pneumonia 04/29/2016    Sepsis ([address]) 04/29/2016    Right knee DJD     Nephrolithiasis     Colon polyp     Ventral hernia 12/05/2012    Seasonal allergic rhinitis 10/03/2012    ED (erectile dysfunction)     Male hypogonadism 11/11/2011     Current Outpatient Medications   Medication Sig Dispense Refill    olmesartan-hydroCHLOROthiazide (BENICAR HCT) 20-12.5 MG per tablet Take 1 tablet by mouth daily 90 tablet 3    NONFORMULARY Jigar eye drops      Multiple Vitamins-Minerals (MULTIVITAMIN ADULT EXTRA C PO) Take 1 tablet by mouth daily      omalizumab 125 mg/mL in sterile water injection Inject into the skin      Omega-3 1000 MG CAPS Take by mouth      CPAP Machine MISC by Other route      albuterol sulfate HFA (PROVENTIL;VENTOLIN;PROAIR) 108 (90 Base) MCG/ACT inhaler Inhale 2 puffs into the lungs every 4 hours as needed      anastrozole (ARIMIDEX) 1 MG tablet Take by mouth every 7 days      atorvastatin (LIPITOR) 10 MG tablet TAKE 1

## 2023-12-14 NOTE — PROGRESS NOTES
1. \"Have you been to the ER, urgent care clinic since your last visit? Hospitalized since your last visit? \" No    2. \"Have you seen or consulted any other health care providers outside of the [address] [place] [address] since your last visit? \"       Pulmonary // munir Morillo // sleep study    Leighton Lima // urology   3. For patients aged 43-73: Has the patient had a colonoscopy / FIT/ Cologuard?  No

## 2024-03-04 RX ORDER — ATORVASTATIN CALCIUM 10 MG/1
10 TABLET, FILM COATED ORAL DAILY
Qty: 90 TABLET | Refills: 1 | Status: SHIPPED | OUTPATIENT
Start: 2024-03-04

## 2024-03-04 NOTE — TELEPHONE ENCOUNTER
Patient states he needs refill done thru [facility] on file for his Atorvastatin (LIPITOR) 10 MG tablet. Please call if any questions. Thank you      Patient reminded of [address] on refills

## 2024-04-17 ENCOUNTER — HOSPITAL ENCOUNTER (OUTPATIENT)
Facility: HOSPITAL | Age: 69
Discharge: HOME | End: 2024-04-20
Payer: MEDICARE

## 2024-04-17 ENCOUNTER — TRANSCRIBE ORDERS (OUTPATIENT)
Facility: HOSPITAL | Age: 69
End: 2024-04-17

## 2024-04-17 DIAGNOSIS — R22.41 LOCALIZED SWELLING OF RIGHT LOWER EXTREMITY: Primary | ICD-10-CM

## 2024-04-17 DIAGNOSIS — R22.41 LOCALIZED SWELLING OF RIGHT LOWER EXTREMITY: ICD-10-CM

## 2024-04-17 PROCEDURE — 93971 EXTREMITY STUDY: CPT

## 2024-06-19 ENCOUNTER — OFFICE VISIT (OUTPATIENT)
Age: 69
End: 2024-06-19
Payer: MEDICARE

## 2024-06-19 VITALS
RESPIRATION RATE: 14 BRPM | TEMPERATURE: 97.8 F | HEART RATE: 58 BPM | SYSTOLIC BLOOD PRESSURE: 138 MMHG | OXYGEN SATURATION: 98 % | DIASTOLIC BLOOD PRESSURE: 88 MMHG

## 2024-06-19 DIAGNOSIS — E66.9 OBESITY (BMI 30-39.9): ICD-10-CM

## 2024-06-19 DIAGNOSIS — E78.5 HYPERLIPIDEMIA, UNSPECIFIED HYPERLIPIDEMIA TYPE: ICD-10-CM

## 2024-06-19 DIAGNOSIS — I10 HYPERTENSION, UNSPECIFIED TYPE: ICD-10-CM

## 2024-06-19 DIAGNOSIS — Z00.00 MEDICARE ANNUAL WELLNESS VISIT, SUBSEQUENT: ICD-10-CM

## 2024-06-19 DIAGNOSIS — R79.89 LOW TESTOSTERONE: ICD-10-CM

## 2024-06-19 DIAGNOSIS — J45.909 UNCOMPLICATED ASTHMA, UNSPECIFIED ASTHMA SEVERITY, UNSPECIFIED WHETHER PERSISTENT: ICD-10-CM

## 2024-06-19 DIAGNOSIS — I10 HYPERTENSION, UNSPECIFIED TYPE: Primary | ICD-10-CM

## 2024-06-19 LAB
ALT SERPL-CCNC: 27 U/L (ref 12–78)
ANION GAP SERPL CALC-SCNC: 3 MMOL/L (ref 5–15)
AST SERPL-CCNC: 18 U/L (ref 15–37)
BUN SERPL-MCNC: 23 MG/DL (ref 6–20)
BUN/CREAT SERPL: 20 (ref 12–20)
CALCIUM SERPL-MCNC: 9.2 MG/DL (ref 8.5–10.1)
CHLORIDE SERPL-SCNC: 109 MMOL/L (ref 97–108)
CHOLEST SERPL-MCNC: 147 MG/DL
CO2 SERPL-SCNC: 30 MMOL/L (ref 21–32)
CREAT SERPL-MCNC: 1.13 MG/DL (ref 0.7–1.3)
GLUCOSE SERPL-MCNC: 111 MG/DL (ref 65–100)
HDLC SERPL-MCNC: 36 MG/DL
HDLC SERPL: 4.1 (ref 0–5)
LDLC SERPL CALC-MCNC: 103.2 MG/DL (ref 0–100)
POTASSIUM SERPL-SCNC: 4.4 MMOL/L (ref 3.5–5.1)
SODIUM SERPL-SCNC: 142 MMOL/L (ref 136–145)
TRIGL SERPL-MCNC: 39 MG/DL
TSH SERPL DL<=0.05 MIU/L-ACNC: 1.86 UIU/ML (ref 0.36–3.74)
VLDLC SERPL CALC-MCNC: 7.8 MG/DL

## 2024-06-19 PROCEDURE — 3079F DIAST BP 80-89 MM HG: CPT | Performed by: INTERNAL MEDICINE

## 2024-06-19 PROCEDURE — 1123F ACP DISCUSS/DSCN MKR DOCD: CPT | Performed by: INTERNAL MEDICINE

## 2024-06-19 PROCEDURE — 3075F SYST BP GE 130 - 139MM HG: CPT | Performed by: INTERNAL MEDICINE

## 2024-06-19 PROCEDURE — G0439 PPPS, SUBSEQ VISIT: HCPCS | Performed by: INTERNAL MEDICINE

## 2024-06-19 PROCEDURE — 3017F COLORECTAL CA SCREEN DOC REV: CPT | Performed by: INTERNAL MEDICINE

## 2024-06-19 PROCEDURE — G8417 CALC BMI ABV UP PARAM F/U: HCPCS | Performed by: INTERNAL MEDICINE

## 2024-06-19 PROCEDURE — 99214 OFFICE O/P EST MOD 30 MIN: CPT | Performed by: INTERNAL MEDICINE

## 2024-06-19 PROCEDURE — 4004F PT TOBACCO SCREEN RCVD TLK: CPT | Performed by: INTERNAL MEDICINE

## 2024-06-19 PROCEDURE — G8427 DOCREV CUR MEDS BY ELIG CLIN: HCPCS | Performed by: INTERNAL MEDICINE

## 2024-06-19 ASSESSMENT — PATIENT HEALTH QUESTIONNAIRE - PHQ9
SUM OF ALL RESPONSES TO PHQ QUESTIONS 1-9: 0
SUM OF ALL RESPONSES TO PHQ9 QUESTIONS 1 & 2: 0
1. LITTLE INTEREST OR PLEASURE IN DOING THINGS: NOT AT ALL
2. FEELING DOWN, DEPRESSED OR HOPELESS: NOT AT ALL
SUM OF ALL RESPONSES TO PHQ QUESTIONS 1-9: 0

## 2024-06-19 NOTE — PROGRESS NOTES
Room:  I have reviewed all needed documentation in preparation for visit. Verified patient by name and date of birth. Rooming procedures conducted per protocol. Reviewed allergies and medications with patient.   Gonzales Dangelo is a 68 y.o. male for Medicare AWV    Vitals:    06/19/24 0905   BP: 138/88   Pulse: 58   Resp: 14   Temp: 97.8 °F (36.6 °C)   SpO2: 98%       Patient just recently experienced some bronchitis and a superficial blood clot in his right leg. He was treated at [facility] and feeling better.     Health Maintenance Due   Topic Date Due    Respiratory Syncytial Virus (RSV) Pregnant or age 60 yrs+ (1 - 1-dose 60+ series) Never done    Annual Wellness Visit (Medicare)  06/08/2024    Lipids  06/08/2024    Depression Screen  06/08/2024        \"Have you been to the ER, urgent care clinic since your last visit?  Hospitalized since your last visit?\"    YES - When: approximately 1 month and a few weeks ago.  Where and Why: Twice-Bronchitis attack due to stress, [facility]. Patient received medication. Superficial blood clot to right leg. [facility]. Patient did a CAT scan and was told it should be clear in two weeks. Patient states that he believes it is completely gone.     “Have you seen or consulted any other health care providers outside of [facility] since your last visit?”    NO       Mary \"Kenneth\" CHRIS Russell    
tablet Take by mouth every 7 days Yes Automatic Reconciliation, Ar   vitamin D3 (CHOLECALCIFEROL) 125 MCG (5000 UT) TABS tablet Take by mouth 2 times daily Yes Automatic Reconciliation, Ar   clomiPHENE (CLOMID) 50 MG tablet Take by mouth daily Yes Automatic Reconciliation, Ar   fluticasone furoate-vilanterol (BREO ELLIPTA) 200-25 MCG/ACT AEPB inhaler Inhale 1 puff into the lungs daily Yes Automatic Reconciliation, Ar   fluticasone (FLONASE) 50 MCG/ACT nasal spray USE 2 SPRAYS IN EACH NOSTRIL DAILY Yes Automatic Reconciliation, Ar   montelukast (SINGULAIR) 10 MG tablet TAKE 1 TABLET BY MOUTH DAILY Yes Automatic Reconciliation, Ar       CareTeam (Including outside providers/suppliers regularly involved in providing care):   Patient Care Team:  Dale Rios MD as PCP - General  Dale Rios MD as PCP - Empaneled Provider     Reviewed and updated this visit:  Allergies  Meds  Problems  Med Hx  Surg Hx  Soc Hx  Fam Hx

## 2024-06-19 NOTE — PATIENT INSTRUCTIONS
plan.  Follow-up care is a key part of your treatment and safety. Be sure to make and go to all appointments, and call your doctor if you are having problems. It's also a good idea to know your test results and keep a list of the medicines you take.  How can you care for yourself at home?  Set realistic goals. Many people expect to lose much more weight than is likely. A weight loss of 5% to 10% of your body weight may be enough to improve your health.  Get family and friends involved to provide support. Talk to them about why you are trying to lose weight, and ask them to help. They can help by participating in exercise and having meals with you, even if they may be eating something different.  Find what works best for you. If you do not have time or do not like to cook, a program that offers meal replacement bars or shakes may be better for you. Or if you like to prepare meals, finding a plan that includes daily menus and recipes may be best.  Ask your doctor about other health professionals who can help you achieve your weight loss goals.  A dietitian can help you make healthy changes in your diet.  An exercise specialist or  can help you develop a safe and effective exercise program.  A counselor or psychiatrist can help you cope with issues such as depression, anxiety, or family problems that can make it hard to focus on weight loss.  Consider joining a support group for people who are trying to lose weight. Your doctor can suggest groups in your area.  Where can you learn more?  Go to https://www.[employer].net/patientEd and enter U357 to learn more about \"Starting a Weight Loss Plan: Care Instructions.\"  Current as of: September 20, 2023  Content Version: 14.1  © 5724-6895 [employer].   Care instructions adapted under license by [employer]. If you have questions about a medical condition or this instruction, always ask your healthcare professional. Healthwise, [employer]

## 2024-06-26 ENCOUNTER — ANESTHESIA (OUTPATIENT)
Facility: HOSPITAL | Age: 69
End: 2024-06-26
Payer: MEDICARE

## 2024-06-26 ENCOUNTER — ANESTHESIA EVENT (OUTPATIENT)
Facility: HOSPITAL | Age: 69
End: 2024-06-26
Payer: MEDICARE

## 2024-06-26 ENCOUNTER — HOSPITAL ENCOUNTER (OUTPATIENT)
Facility: HOSPITAL | Age: 69
Setting detail: OUTPATIENT SURGERY
Discharge: HOME | End: 2024-06-26
Attending: INTERNAL MEDICINE | Admitting: INTERNAL MEDICINE
Payer: MEDICARE

## 2024-06-26 VITALS
WEIGHT: 258 LBS | TEMPERATURE: 97.9 F | RESPIRATION RATE: 13 BRPM | SYSTOLIC BLOOD PRESSURE: 121 MMHG | DIASTOLIC BLOOD PRESSURE: 78 MMHG | OXYGEN SATURATION: 95 % | HEART RATE: 70 BPM | HEIGHT: 71 IN | BODY MASS INDEX: 36.12 KG/M2

## 2024-06-26 PROCEDURE — 3700000001 HC ADD 15 MINUTES (ANESTHESIA): Performed by: INTERNAL MEDICINE

## 2024-06-26 PROCEDURE — 88305 TISSUE EXAM BY PATHOLOGIST: CPT

## 2024-06-26 PROCEDURE — 2500000003 HC RX 250 WO HCPCS: Performed by: NURSE ANESTHETIST, CERTIFIED REGISTERED

## 2024-06-26 PROCEDURE — 7100000010 HC PHASE II RECOVERY - FIRST 15 MIN: Performed by: INTERNAL MEDICINE

## 2024-06-26 PROCEDURE — 2580000003 HC RX 258: Performed by: INTERNAL MEDICINE

## 2024-06-26 PROCEDURE — 3600007511: Performed by: INTERNAL MEDICINE

## 2024-06-26 PROCEDURE — 3600007501: Performed by: INTERNAL MEDICINE

## 2024-06-26 PROCEDURE — 2709999900 HC NON-CHARGEABLE SUPPLY: Performed by: INTERNAL MEDICINE

## 2024-06-26 PROCEDURE — 6360000002 HC RX W HCPCS: Performed by: NURSE ANESTHETIST, CERTIFIED REGISTERED

## 2024-06-26 PROCEDURE — 3700000000 HC ANESTHESIA ATTENDED CARE: Performed by: INTERNAL MEDICINE

## 2024-06-26 PROCEDURE — 7100000011 HC PHASE II RECOVERY - ADDTL 15 MIN: Performed by: INTERNAL MEDICINE

## 2024-06-26 RX ORDER — SODIUM CHLORIDE 0.9 % (FLUSH) 0.9 %
5-40 SYRINGE (ML) INJECTION EVERY 12 HOURS SCHEDULED
Status: DISCONTINUED | OUTPATIENT
Start: 2024-06-26 | End: 2024-06-26 | Stop reason: HOSPADM

## 2024-06-26 RX ORDER — SODIUM CHLORIDE 0.9 % (FLUSH) 0.9 %
5-40 SYRINGE (ML) INJECTION PRN
Status: DISCONTINUED | OUTPATIENT
Start: 2024-06-26 | End: 2024-06-26 | Stop reason: HOSPADM

## 2024-06-26 RX ORDER — SODIUM CHLORIDE 9 MG/ML
25 INJECTION, SOLUTION INTRAVENOUS PRN
Status: DISCONTINUED | OUTPATIENT
Start: 2024-06-26 | End: 2024-06-26 | Stop reason: HOSPADM

## 2024-06-26 RX ORDER — LIDOCAINE HYDROCHLORIDE 20 MG/ML
INJECTION, SOLUTION EPIDURAL; INFILTRATION; INTRACAUDAL; PERINEURAL PRN
Status: DISCONTINUED | OUTPATIENT
Start: 2024-06-26 | End: 2024-06-26 | Stop reason: SDUPTHER

## 2024-06-26 RX ADMIN — PROPOFOL 50 MG: 10 INJECTION, EMULSION INTRAVENOUS at 14:28

## 2024-06-26 RX ADMIN — PROPOFOL 50 MG: 10 INJECTION, EMULSION INTRAVENOUS at 14:31

## 2024-06-26 RX ADMIN — PROPOFOL 50 MG: 10 INJECTION, EMULSION INTRAVENOUS at 14:09

## 2024-06-26 RX ADMIN — PROPOFOL 50 MG: 10 INJECTION, EMULSION INTRAVENOUS at 14:05

## 2024-06-26 RX ADMIN — LIDOCAINE HYDROCHLORIDE 100 MG: 20 INJECTION, SOLUTION EPIDURAL; INFILTRATION; INTRACAUDAL; PERINEURAL at 14:00

## 2024-06-26 RX ADMIN — PROPOFOL 50 MG: 10 INJECTION, EMULSION INTRAVENOUS at 14:25

## 2024-06-26 RX ADMIN — PROPOFOL 50 MG: 10 INJECTION, EMULSION INTRAVENOUS at 14:22

## 2024-06-26 RX ADMIN — SODIUM CHLORIDE: 9 INJECTION, SOLUTION INTRAVENOUS at 13:55

## 2024-06-26 RX ADMIN — PROPOFOL 100 MG: 10 INJECTION, EMULSION INTRAVENOUS at 14:00

## 2024-06-26 RX ADMIN — PROPOFOL 50 MG: 10 INJECTION, EMULSION INTRAVENOUS at 14:20

## 2024-06-26 RX ADMIN — PROPOFOL 50 MG: 10 INJECTION, EMULSION INTRAVENOUS at 14:17

## 2024-06-26 RX ADMIN — PROPOFOL 50 MG: 10 INJECTION, EMULSION INTRAVENOUS at 14:03

## 2024-06-26 RX ADMIN — PROPOFOL 50 MG: 10 INJECTION, EMULSION INTRAVENOUS at 14:13

## 2024-06-26 ASSESSMENT — PAIN - FUNCTIONAL ASSESSMENT: PAIN_FUNCTIONAL_ASSESSMENT: 0-10

## 2024-06-26 NOTE — H&P
[facility]  Outpatient History and Physical    Patient: Gonzales Dangelo    Physician: Bg Carrington MD    Vital Signs: Blood pressure 131/68, pulse 76, temperature 98 °F (36.7 °C), temperature source Tympanic, resp. rate 19, height 1.803 m (5' 11\"), weight 117 kg (258 lb), SpO2 96 %.    Allergies:   Allergies   Allergen Reactions    Iodine Hives     IVP contrast       Chief Complaint: hx/o polyps ~10 in .      History:  Past Medical History:   Diagnosis Date    Alcohol dependence (HCC)     Asthma     X 27, seasonal     Colon polyp     ED (erectile dysfunction)     GERD (gastroesophageal reflux disease)     after meals only, diet controlled no meds    Hypertension     IBS (irritable bowel syndrome)     Nephrolithiasis     Positive PPD     Right knee DJD     Seasonal allergic rhinitis     Ventral hernia 2012      Past Surgical History:   Procedure Laterality Date    COLONOSCOPY  2014    due 2019    PROSTATE BIOPSY, NEEDLE, SATURATION SAMPLING      prostate bx     PROSTHESIS, PENILE, INFLATAB      TOTAL KNEE ARTHROPLASTY Left 2018    VASECTOMY        Social History     Socioeconomic History    Marital status:      Spouse name: None    Number of children: None    Years of education: None    Highest education level: None   Tobacco Use    Smoking status: Former     Current packs/day: 0.00     Types: Cigarettes     Quit date: 1981     Years since quittin.8    Smokeless tobacco: Never   Vaping Use    Vaping Use: Never used   Substance and Sexual Activity    Alcohol use: Yes     Comment: Rarely    Drug use: Not Currently     Types: Prescription, OTC    Sexual activity: Defer     Social Determinants of Health     Financial Resource Strain: Low Risk  (2023)    Overall Financial Resource Strain (CARDIA)     Difficulty of Paying Living Expenses: Not hard at all   Transportation Needs: Unknown (2023)    PRAPARE - Transportation     Lack of Transportation

## 2024-06-26 NOTE — DISCHARGE INSTRUCTIONS
symptoms?  In diverticulosis, most people don't have symptoms.  In diverticulitis, symptoms may last from a few hours to a week or more. They include:  Belly pain. This is usually in the lower left side. It is sometimes worse when you move. This is the most common symptom.  Fever and chills.  Bloating and gas.  Diarrhea or constipation.  Nausea and sometimes vomiting.  Not feeling like eating.  If the pouches bleed, it is called diverticular bleeding.  How can you prevent diverticulitis?  You may be able to lower your chance of getting diverticulitis. You can do this by taking steps to prevent constipation.  Eat fruits, vegetables, beans, and whole grains every day. These foods are high in fiber.  Drink plenty of fluids. If you have kidney, heart, or liver disease and have to limit fluids, talk with your doctor before you increase the amount of fluids you drink.  Get at least 30 minutes of exercise on most days of the week. Walking is a good choice.  Take a fiber supplement (such as Citrucel or Metamucil) every day if needed. Read and follow all instructions on the label.  Schedule time each day for a bowel movement. Having a daily routine may help. Take your time and do not strain when having a bowel movement.  How are these problems treated?  The best way to treat diverticulosis is to avoid constipation. Treatment for diverticulitis includes antibiotics. It often includes a change in your diet. You may need only liquids at first. Your doctor may suggest medicines for pain or belly cramps. In some cases, surgery may be needed.  Follow-up care is a key part of your treatment and safety. Be sure to make and go to all appointments, and call your doctor if you are having problems. It's also a good idea to know your test results and keep a list of the medicines you take.  Where can you learn more?  Go to https://www.healthwise.net/patientEd and enter E426 to learn more about \"Learning About Diverticulosis and

## 2024-06-26 NOTE — OP NOTE
Colonoscopy Procedure Note      Indications:  hx/o polyps     :  Bg Carrington MD    Staff: Circulator: Shiela Nix RN    Referring Provider: Dale Rios MD    Sedation:  MAC anesthesia Propofol    Procedure Details:  After informed consent was obtained with all risks and benefits of procedure explained and preoperative exam completed, the patient was taken to the endoscopy suite and placed in the left lateral decubitus position.  Upon sequential sedation as per above, a digital rectal exam was performed per below.  The Olympus videocolonoscope was inserted in the rectum and carefully advanced to the  ileocecal valve .  The quality of preparation was good BPS 2/2/2 6.  The colonoscope was slowly withdrawn with careful evaluation between folds. Retroflexion in the rectum was performed.     Findings: Extremely difficult procedure with extremely redundant colon requiring 3 Rns giving external pressure unable to reach cecum due due difficulty with anesthesia (he did well).  OFE: unremarkable  Rectum: normal  no mucosal lesion appreciated  Sigmoid: moderate diverticulosis, otherwise unremarkable  Descending Colon: moderate diverticulosis, otherwise unremarkable  Transverse Colon: moderate diverticulosis, otherwise unremarkable  Ascending Colon: moderate diverticulosis, otherwise unremarkable  - two sessile polyps ranging 3-6mm removed with cold snare polypectomy, retrieved, minimal bleeding  Cecum: not intubated  Terminal Ileum: not intubated    Complications: None.     EBL:  minimal    Impression:    See Findings above    Recommendations:   - await pathology. You should receive a letter within 2 weeks.   - Resume normal medications.  - Recommend repeat colonoscopy in 1 year with 1 hour block and therapeutic endoscopist.  - discussed w/ Abi    Discharge Disposition:  Home in the company of a  when able to ambulate.    Bg Carrington MD  6/26/2024  2:41 PM

## 2024-06-26 NOTE — ANESTHESIA PRE PROCEDURE
NPO Status:                                                                                 BMI:   Wt Readings from Last 3 Encounters:   12/14/23 120.6 kg (265 lb 12.8 oz)   06/08/23 123.5 kg (272 lb 3.2 oz)   12/13/22 123.8 kg (273 lb)     There is no height or weight on file to calculate BMI.    CBC:   Lab Results   Component Value Date/Time    WBC 5.7 12/14/2023 10:02 AM    RBC 4.62 12/14/2023 10:02 AM    HGB 13.6 12/14/2023 10:02 AM    HCT 41.8 12/14/2023 10:02 AM    MCV 90.5 12/14/2023 10:02 AM    RDW 14.2 12/14/2023 10:02 AM     12/14/2023 10:02 AM       CMP:   Lab Results   Component Value Date/Time     06/19/2024 09:23 AM    K 4.4 06/19/2024 09:23 AM     06/19/2024 09:23 AM    CO2 30 06/19/2024 09:23 AM    BUN 23 06/19/2024 09:23 AM    CREATININE 1.13 06/19/2024 09:23 AM    GFRAA >60 05/19/2022 09:51 AM    AGRATIO 0.8 11/23/2022 10:26 AM    LABGLOM 70 06/19/2024 09:23 AM    LABGLOM >60 12/14/2023 10:02 AM    LABGLOM >60 11/23/2022 10:26 AM    GLUCOSE 111 06/19/2024 09:23 AM    CALCIUM 9.2 06/19/2024 09:23 AM    BILITOT 0.3 06/08/2023 02:25 PM    ALKPHOS 50 06/08/2023 02:25 PM    ALKPHOS 56 11/23/2022 10:26 AM    AST 18 06/19/2024 09:23 AM    ALT 27 06/19/2024 09:23 AM       POC Tests: No results for input(s): \"POCGLU\", \"POCNA\", \"POCK\", \"POCCL\", \"POCBUN\", \"POCHEMO\", \"POCHCT\" in the last 72 hours.    Coags: No results found for: \"PROTIME\", \"INR\", \"APTT\"    HCG (If Applicable): No results found for: \"PREGTESTUR\", \"PREGSERUM\", \"HCG\", \"HCGQUANT\"     ABGs: No results found for: \"PHART\", \"PO2ART\", \"XSB1LFD\", \"OTL1DWK\", \"BEART\", \"B4VAYOVE\"     Type & Screen (If Applicable):  No results found for: \"LABABO\"    Drug/Infectious Status (If Applicable):  Lab Results   Component Value Date/Time    HEPCAB 0.2 08/01/2018 08:06 AM       COVID-19 Screening (If Applicable): No results found for: \"COVID19\"        Anesthesia Evaluation  Patient summary reviewed and Nursing notes reviewed  Airway:

## 2024-06-26 NOTE — PROGRESS NOTES
Endoscope was pre-cleaned at bedside immediately following procedure by Calvin Keller.  Glasses returned to patient

## 2024-06-26 NOTE — PROGRESS NOTES
ARRIVAL INFORMATION:  Verified patient name and date of birth, scheduled procedure, and informed consent.     : Abi martinez  contact number: [phone]  Physician and staff can share information with the .     Belongings with patient include:  Clothing,Glasses, all personal belongings left with family    GI FOCUSED ASSESSMENT:  Neuro: Awake, alert, oriented x4  Respiratory: even and unlabored   GI: soft and non-distended  EKG Rhythm: normal sinus rhythm    Education:Reviewed general discharge instructions and  information.

## 2024-06-27 NOTE — ANESTHESIA POSTPROCEDURE EVALUATION
Department of Anesthesiology  Postprocedure Note    Patient: Gonzales Dangelo  MRN: [id number]  YOB: 1955  Date of evaluation: 6/27/2024    Procedure Summary       Date: 06/26/24 Room / Location: [facility] 02 / [facility] ENDOSCOPY    Anesthesia Start: 1355 Anesthesia Stop: 1444    Procedure: COLONOSCOPY Diagnosis:       Personal history of colonic polyps      Diverticulosis      (Personal history of colonic polyps [Z86.010])    Surgeons: Bg Carrington MD Responsible Provider: Jerod Helton MD    Anesthesia Type: TIVA ASA Status: 3            Anesthesia Type: TIVA    Salbador Phase I: Salbador Score: 10    Salbador Phase II: Salbador Score: 10    Anesthesia Post Evaluation    Patient location during evaluation: PACU  Patient participation: complete - patient participated  Level of consciousness: sleepy but conscious and responsive to verbal stimuli  Airway patency: patent  Nausea & Vomiting: no vomiting and no nausea  Cardiovascular status: blood pressure returned to baseline and hemodynamically stable  Respiratory status: acceptable  Hydration status: stable    No notable events documented.

## 2024-08-29 RX ORDER — ATORVASTATIN CALCIUM 10 MG/1
10 TABLET, FILM COATED ORAL DAILY
Qty: 90 TABLET | Refills: 1 | Status: SHIPPED | OUTPATIENT
Start: 2024-08-29

## 2024-08-29 NOTE — TELEPHONE ENCOUNTER
PCP: Dale Rios MD    Last appt: 6/19/2024   Future Appointments   Date Time Provider Department Center   12/16/2024 10:15 AM Dale Rios MD [facility] DEP       Requested Prescriptions     Pending Prescriptions Disp Refills    atorvastatin (LIPITOR) 10 MG tablet 90 tablet 1     Sig: Take 1 tablet by mouth daily

## 2024-12-14 NOTE — PROGRESS NOTES
HISTORY OF PRESENT ILLNESS   Gonzales Dangelo   is a 68 y.o.  male.  F/u htn hld eosinophilic asthma obesity, dejah on cpap , hx colon polyp low T .  PULM Dr Dougherty on xolair-will see Dr Monk now. Asthma is controlled per pt  URO Dr Germain on clomiphene-s/p penile implants 12 yrs ago    Had colonoscopy this year-polyps removed, repeat 1 year-unable t reach cecum redundant colon-Dr Carrington  Home BP    Retired from [employer]  dept  Somewhat interesteed in GLP 1 med for obesity but prefers diet and exercise for now  Denies cp sob     Fairly active--stationary bike 30 minutes 3 days per week  Last OV     Went to UC x2 in may-for sup thrombophleblitus which resolved and bronchiitis resolved with prednisone  On breo qd and xolair inj -PULM  URO--PSA done yearly on clomiphene     Trying to exercise again 3 times week -ex bike and mow grass  Has mild edema b/l     Colonosocpy scheduled next week for hx polyps--Dr Carrington     Patient Active Problem List    Diagnosis Date Noted    Severe obesity (BMI 35.0-39.9) with comorbidity 06/08/2023    DEJAH (obstructive sleep apnea) 10/23/2019    Pure hypercholesterolemia 10/11/2018    Situational stress 12/15/2017    Moderate persistent asthma without complication 01/26/2017    Essential hypertension 01/26/2017    Pneumonia 04/29/2016    Sepsis (HCC) 04/29/2016    Right knee DJD     Nephrolithiasis     Colon polyp     Ventral hernia 12/05/2012    Seasonal allergic rhinitis 10/03/2012    ED (erectile dysfunction)     Male hypogonadism 11/11/2011     Current Outpatient Medications   Medication Sig Dispense Refill    atorvastatin (LIPITOR) 10 MG tablet Take 1 tablet by mouth daily 90 tablet 1    olmesartan-hydroCHLOROthiazide (BENICAR HCT) 20-12.5 MG per tablet Take 1 tablet by mouth daily 90 tablet 3    NONFORMULARY Jigar eye drops- For allergies      Multiple Vitamins-Minerals (MULTIVITAMIN ADULT EXTRA C PO) Take 1 tablet by mouth daily      omalizumab 125 mg/mL in sterile water

## 2024-12-15 SDOH — ECONOMIC STABILITY: FOOD INSECURITY: WITHIN THE PAST 12 MONTHS, YOU WORRIED THAT YOUR FOOD WOULD RUN OUT BEFORE YOU GOT MONEY TO BUY MORE.: NEVER TRUE

## 2024-12-15 SDOH — ECONOMIC STABILITY: FOOD INSECURITY: WITHIN THE PAST 12 MONTHS, THE FOOD YOU BOUGHT JUST DIDN'T LAST AND YOU DIDN'T HAVE MONEY TO GET MORE.: NEVER TRUE

## 2024-12-15 SDOH — ECONOMIC STABILITY: TRANSPORTATION INSECURITY
IN THE PAST 12 MONTHS, HAS LACK OF TRANSPORTATION KEPT YOU FROM MEETINGS, WORK, OR FROM GETTING THINGS NEEDED FOR DAILY LIVING?: NO

## 2024-12-15 SDOH — ECONOMIC STABILITY: INCOME INSECURITY: HOW HARD IS IT FOR YOU TO PAY FOR THE VERY BASICS LIKE FOOD, HOUSING, MEDICAL CARE, AND HEATING?: NOT HARD AT ALL

## 2024-12-16 ENCOUNTER — OFFICE VISIT (OUTPATIENT)
Age: 69
End: 2024-12-16
Payer: MEDICARE

## 2024-12-16 VITALS
HEIGHT: 71 IN | BODY MASS INDEX: 37.83 KG/M2 | TEMPERATURE: 98 F | WEIGHT: 270.2 LBS | SYSTOLIC BLOOD PRESSURE: 130 MMHG | RESPIRATION RATE: 16 BRPM | OXYGEN SATURATION: 97 % | HEART RATE: 74 BPM | DIASTOLIC BLOOD PRESSURE: 78 MMHG

## 2024-12-16 DIAGNOSIS — I10 HYPERTENSION, UNSPECIFIED TYPE: ICD-10-CM

## 2024-12-16 DIAGNOSIS — I10 HYPERTENSION, UNSPECIFIED TYPE: Primary | ICD-10-CM

## 2024-12-16 DIAGNOSIS — R73.09 ELEVATED GLUCOSE: ICD-10-CM

## 2024-12-16 DIAGNOSIS — E78.5 HYPERLIPIDEMIA, UNSPECIFIED HYPERLIPIDEMIA TYPE: ICD-10-CM

## 2024-12-16 DIAGNOSIS — Z86.0100 HX OF COLONIC POLYP: ICD-10-CM

## 2024-12-16 LAB
ANION GAP SERPL CALC-SCNC: 4 MMOL/L (ref 2–12)
BUN SERPL-MCNC: 22 MG/DL (ref 6–20)
BUN/CREAT SERPL: 21 (ref 12–20)
CALCIUM SERPL-MCNC: 9.5 MG/DL (ref 8.5–10.1)
CHLORIDE SERPL-SCNC: 104 MMOL/L (ref 97–108)
CO2 SERPL-SCNC: 31 MMOL/L (ref 21–32)
CREAT SERPL-MCNC: 1.07 MG/DL (ref 0.7–1.3)
ERYTHROCYTE [DISTWIDTH] IN BLOOD BY AUTOMATED COUNT: 14.1 % (ref 11.5–14.5)
EST. AVERAGE GLUCOSE BLD GHB EST-MCNC: 117 MG/DL
GLUCOSE SERPL-MCNC: 104 MG/DL (ref 65–100)
HBA1C MFR BLD: 5.7 % (ref 4–5.6)
HCT VFR BLD AUTO: 42.5 % (ref 36.6–50.3)
HGB BLD-MCNC: 13.7 G/DL (ref 12.1–17)
MCH RBC QN AUTO: 29.9 PG (ref 26–34)
MCHC RBC AUTO-ENTMCNC: 32.2 G/DL (ref 30–36.5)
MCV RBC AUTO: 92.8 FL (ref 80–99)
NRBC # BLD: 0 K/UL (ref 0–0.01)
NRBC BLD-RTO: 0 PER 100 WBC
PLATELET # BLD AUTO: 231 K/UL (ref 150–400)
PMV BLD AUTO: 11 FL (ref 8.9–12.9)
POTASSIUM SERPL-SCNC: 4 MMOL/L (ref 3.5–5.1)
RBC # BLD AUTO: 4.58 M/UL (ref 4.1–5.7)
SODIUM SERPL-SCNC: 139 MMOL/L (ref 136–145)
WBC # BLD AUTO: 6.1 K/UL (ref 4.1–11.1)

## 2024-12-16 PROCEDURE — 3017F COLORECTAL CA SCREEN DOC REV: CPT | Performed by: INTERNAL MEDICINE

## 2024-12-16 PROCEDURE — G8484 FLU IMMUNIZE NO ADMIN: HCPCS | Performed by: INTERNAL MEDICINE

## 2024-12-16 PROCEDURE — 1159F MED LIST DOCD IN RCRD: CPT | Performed by: INTERNAL MEDICINE

## 2024-12-16 PROCEDURE — 1036F TOBACCO NON-USER: CPT | Performed by: INTERNAL MEDICINE

## 2024-12-16 PROCEDURE — 99214 OFFICE O/P EST MOD 30 MIN: CPT | Performed by: INTERNAL MEDICINE

## 2024-12-16 PROCEDURE — 1126F AMNT PAIN NOTED NONE PRSNT: CPT | Performed by: INTERNAL MEDICINE

## 2024-12-16 PROCEDURE — 3078F DIAST BP <80 MM HG: CPT | Performed by: INTERNAL MEDICINE

## 2024-12-16 PROCEDURE — G8427 DOCREV CUR MEDS BY ELIG CLIN: HCPCS | Performed by: INTERNAL MEDICINE

## 2024-12-16 PROCEDURE — 1123F ACP DISCUSS/DSCN MKR DOCD: CPT | Performed by: INTERNAL MEDICINE

## 2024-12-16 PROCEDURE — 3075F SYST BP GE 130 - 139MM HG: CPT | Performed by: INTERNAL MEDICINE

## 2024-12-16 PROCEDURE — G8417 CALC BMI ABV UP PARAM F/U: HCPCS | Performed by: INTERNAL MEDICINE

## 2024-12-16 RX ORDER — FLUOROMETHOLONE 1 MG/ML
SUSPENSION/ DROPS OPHTHALMIC
COMMUNITY
Start: 2024-10-28

## 2024-12-16 RX ORDER — OLMESARTAN MEDOXOMIL AND HYDROCHLOROTHIAZIDE 20/12.5 20; 12.5 MG/1; MG/1
1 TABLET ORAL DAILY
Qty: 90 TABLET | Refills: 3 | Status: SHIPPED | OUTPATIENT
Start: 2024-12-16

## 2024-12-16 RX ORDER — LOTEPREDNOL ETABONATE 2 MG/ML
SUSPENSION/ DROPS OPHTHALMIC
COMMUNITY
Start: 2024-10-24

## 2024-12-16 ASSESSMENT — PATIENT HEALTH QUESTIONNAIRE - PHQ9
SUM OF ALL RESPONSES TO PHQ QUESTIONS 1-9: 0
2. FEELING DOWN, DEPRESSED OR HOPELESS: NOT AT ALL
1. LITTLE INTEREST OR PLEASURE IN DOING THINGS: NOT AT ALL
SUM OF ALL RESPONSES TO PHQ9 QUESTIONS 1 & 2: 0

## 2025-02-17 RX ORDER — ATORVASTATIN CALCIUM 10 MG/1
10 TABLET, FILM COATED ORAL DAILY
Qty: 90 TABLET | Refills: 3 | Status: SHIPPED | OUTPATIENT
Start: 2025-02-17

## 2025-06-15 NOTE — PROGRESS NOTES
HISTORY OF PRESENT ILLNESS   Gonzales Dangelo   is a 69 y.o.  male.  F/u htn hld eosinophilic asthma obesity, tim on cpap , hx colon polyp low T . prediabetes  PULM Dr Dougherty on xolair-will see Dr Monk now. Asthma is controlled per pt. 2 flares ups this year--pred taper.  URO Dr Germain on clomiphene-s/p penile implant., PSA and labs per URO    Recent left lower leg bruising for last 2 months. Not fading.  Not certain about any trauma for left lower leg  Weight up 9 lbs this year after steroid tapers  Home BP  Exercising again -stationary bike 3-4 times per week --30 minutes    Scheduled fr colonoscopy next month  Last OV     Had colonoscopy this year-polyps removed, repeat 1 year-unable t reach cecum redundant colon-Dr Carrington  Home BP     Retired from [employer]  dept  Somewhat interesteed in GLP 1 med for obesity but prefers diet and exercise for now  Denies cp sob      Fairly active--stationary bike 30 minutes 3 days per week  Patient Active Problem List    Diagnosis Date Noted    Severe obesity (BMI 35.0-39.9) with comorbidity (HCC) 06/08/2023    TIM (obstructive sleep apnea) 10/23/2019    Pure hypercholesterolemia 10/11/2018    Situational stress 12/15/2017    Moderate persistent asthma without complication 01/26/2017    Essential hypertension 01/26/2017    Pneumonia 04/29/2016    Sepsis (HCC) 04/29/2016    Right knee DJD     Nephrolithiasis     Colon polyp     Ventral hernia 12/05/2012    Seasonal allergic rhinitis 10/03/2012    ED (erectile dysfunction)     Male hypogonadism 11/11/2011     Current Outpatient Medications   Medication Sig Dispense Refill    atorvastatin (LIPITOR) 10 MG tablet Take 1 tablet by mouth daily 90 tablet 3    fluorometholone (FML) 0.1 % ophthalmic suspension       ALREX 0.2 % SUSP       olmesartan-hydroCHLOROthiazide (BENICAR HCT) 20-12.5 MG per tablet Take 1 tablet by mouth daily 90 tablet 3    NONFORMULARY Jigar eye drops- For allergies      Multiple Vitamins-Minerals

## 2025-06-17 ENCOUNTER — OFFICE VISIT (OUTPATIENT)
Age: 70
End: 2025-06-17
Payer: MEDICARE

## 2025-06-17 VITALS
TEMPERATURE: 98 F | RESPIRATION RATE: 16 BRPM | WEIGHT: 279 LBS | HEART RATE: 82 BPM | OXYGEN SATURATION: 95 % | DIASTOLIC BLOOD PRESSURE: 80 MMHG | SYSTOLIC BLOOD PRESSURE: 132 MMHG | BODY MASS INDEX: 39.94 KG/M2 | HEIGHT: 70 IN

## 2025-06-17 DIAGNOSIS — E66.9 OBESITY (BMI 30-39.9): ICD-10-CM

## 2025-06-17 DIAGNOSIS — I10 HYPERTENSION, UNSPECIFIED TYPE: Primary | ICD-10-CM

## 2025-06-17 DIAGNOSIS — R73.03 PREDIABETES: ICD-10-CM

## 2025-06-17 DIAGNOSIS — J45.909 UNCOMPLICATED ASTHMA, UNSPECIFIED ASTHMA SEVERITY, UNSPECIFIED WHETHER PERSISTENT: ICD-10-CM

## 2025-06-17 DIAGNOSIS — G47.33 OSA ON CPAP: ICD-10-CM

## 2025-06-17 DIAGNOSIS — E78.5 HYPERLIPIDEMIA, UNSPECIFIED HYPERLIPIDEMIA TYPE: ICD-10-CM

## 2025-06-17 DIAGNOSIS — I10 HYPERTENSION, UNSPECIFIED TYPE: ICD-10-CM

## 2025-06-17 PROCEDURE — 1159F MED LIST DOCD IN RCRD: CPT | Performed by: INTERNAL MEDICINE

## 2025-06-17 PROCEDURE — G8427 DOCREV CUR MEDS BY ELIG CLIN: HCPCS | Performed by: INTERNAL MEDICINE

## 2025-06-17 PROCEDURE — 1123F ACP DISCUSS/DSCN MKR DOCD: CPT | Performed by: INTERNAL MEDICINE

## 2025-06-17 PROCEDURE — 3017F COLORECTAL CA SCREEN DOC REV: CPT | Performed by: INTERNAL MEDICINE

## 2025-06-17 PROCEDURE — 1036F TOBACCO NON-USER: CPT | Performed by: INTERNAL MEDICINE

## 2025-06-17 PROCEDURE — 3079F DIAST BP 80-89 MM HG: CPT | Performed by: INTERNAL MEDICINE

## 2025-06-17 PROCEDURE — 1125F AMNT PAIN NOTED PAIN PRSNT: CPT | Performed by: INTERNAL MEDICINE

## 2025-06-17 PROCEDURE — 3075F SYST BP GE 130 - 139MM HG: CPT | Performed by: INTERNAL MEDICINE

## 2025-06-17 PROCEDURE — 99214 OFFICE O/P EST MOD 30 MIN: CPT | Performed by: INTERNAL MEDICINE

## 2025-06-17 PROCEDURE — G8417 CALC BMI ABV UP PARAM F/U: HCPCS | Performed by: INTERNAL MEDICINE

## 2025-06-17 RX ORDER — CLOBETASOL PROPIONATE 0.5 MG/G
CREAM TOPICAL
Qty: 30 G | Refills: 1 | Status: SHIPPED | OUTPATIENT
Start: 2025-06-17

## 2025-06-17 SDOH — ECONOMIC STABILITY: FOOD INSECURITY: WITHIN THE PAST 12 MONTHS, YOU WORRIED THAT YOUR FOOD WOULD RUN OUT BEFORE YOU GOT MONEY TO BUY MORE.: NEVER TRUE

## 2025-06-17 SDOH — ECONOMIC STABILITY: FOOD INSECURITY: WITHIN THE PAST 12 MONTHS, THE FOOD YOU BOUGHT JUST DIDN'T LAST AND YOU DIDN'T HAVE MONEY TO GET MORE.: NEVER TRUE

## 2025-06-17 ASSESSMENT — PATIENT HEALTH QUESTIONNAIRE - PHQ9
1. LITTLE INTEREST OR PLEASURE IN DOING THINGS: NOT AT ALL
SUM OF ALL RESPONSES TO PHQ QUESTIONS 1-9: 0
2. FEELING DOWN, DEPRESSED OR HOPELESS: NOT AT ALL

## 2025-06-17 NOTE — PROGRESS NOTES
1. Have you been to the ER, urgent care clinic since your last visit?  Hospitalized since your last visit?No    2. Have you seen or consulted any other health care providers outside of the [facility] since your last visit?  Include any pap smears or colon screening. No    Chief Complaint   Patient presents with    6 Month Follow-Up     Major concern bruising on left leg. Patient noticed about couple months ago.     Health Maintenance Due   Topic Date Due    Respiratory Syncytial Virus (RSV) Pregnant or age 60 yrs+ (1 - Risk 60-74 years 1-dose series) Never done    COVID-19 Vaccine (8 - 2024-25 season) 09/01/2024    Lipids  06/19/2025     PHQ-9 Total Score: 0 (6/17/2025 10:24 AM)        6/17/2025    10:00 AM   AMB Abuse Screening   Do you ever feel afraid of your partner? N   Are you in a relationship with someone who physically or mentally threatens you? N   Is it safe for you to go home? Y     Vitals:    06/17/25 1024   BP: 132/80   Pulse: 82   Resp: 16   Temp: 98 °F (36.7 °C)   SpO2: 95%

## 2025-06-18 LAB
ANION GAP SERPL CALC-SCNC: 5 MMOL/L (ref 2–12)
BUN SERPL-MCNC: 21 MG/DL (ref 6–20)
BUN/CREAT SERPL: 17 (ref 12–20)
CALCIUM SERPL-MCNC: 9.3 MG/DL (ref 8.5–10.1)
CHLORIDE SERPL-SCNC: 106 MMOL/L (ref 97–108)
CHOLEST SERPL-MCNC: 128 MG/DL
CO2 SERPL-SCNC: 27 MMOL/L (ref 21–32)
CREAT SERPL-MCNC: 1.22 MG/DL (ref 0.7–1.3)
EST. AVERAGE GLUCOSE BLD GHB EST-MCNC: 123 MG/DL
GLUCOSE SERPL-MCNC: 105 MG/DL (ref 65–100)
HBA1C MFR BLD: 5.9 % (ref 4–5.6)
HDLC SERPL-MCNC: 39 MG/DL
HDLC SERPL: 3.3 (ref 0–5)
LDLC SERPL CALC-MCNC: 81.6 MG/DL (ref 0–100)
POTASSIUM SERPL-SCNC: 4.4 MMOL/L (ref 3.5–5.1)
SODIUM SERPL-SCNC: 138 MMOL/L (ref 136–145)
TRIGL SERPL-MCNC: 37 MG/DL
VLDLC SERPL CALC-MCNC: 7.4 MG/DL

## 2025-06-19 ENCOUNTER — RESULTS FOLLOW-UP (OUTPATIENT)
Age: 70
End: 2025-06-19

## 2025-07-01 ENCOUNTER — ANESTHESIA EVENT (OUTPATIENT)
Facility: HOSPITAL | Age: 70
End: 2025-07-01
Payer: MEDICARE

## 2025-07-01 RX ORDER — SODIUM CHLORIDE 9 MG/ML
INJECTION, SOLUTION INTRAVENOUS PRN
Status: DISCONTINUED | OUTPATIENT
Start: 2025-07-01 | End: 2025-07-02 | Stop reason: HOSPADM

## 2025-07-01 RX ORDER — SODIUM CHLORIDE 0.9 % (FLUSH) 0.9 %
5-40 SYRINGE (ML) INJECTION EVERY 12 HOURS SCHEDULED
Status: DISCONTINUED | OUTPATIENT
Start: 2025-07-01 | End: 2025-07-02 | Stop reason: HOSPADM

## 2025-07-01 RX ORDER — SODIUM CHLORIDE 0.9 % (FLUSH) 0.9 %
5-40 SYRINGE (ML) INJECTION PRN
Status: DISCONTINUED | OUTPATIENT
Start: 2025-07-01 | End: 2025-07-02 | Stop reason: HOSPADM

## 2025-07-01 NOTE — ANESTHESIA PRE PROCEDURE
Department of Anesthesiology  Preprocedure Note       Name:  Gonzales Dangelo   Age:  69 y.o.  :  1955                                          MRN:  [id number]         Date:  2025      Surgeon: Surgeon(s):  Bg Carrington MD    Procedure: Procedure(s):  COLONOSCOPY    Medications prior to admission:   Prior to Admission medications    Medication Sig Start Date End Date Taking? Authorizing Provider   clobetasol (TEMOVATE) 0.05 % cream Apply topically 2 times daily.  Patient taking differently: as needed 25   Dale Rios MD   atorvastatin (LIPITOR) 10 MG tablet Take 1 tablet by mouth daily 25   Dale Rios MD   ALREX 0.2 % SUSP Place 1 drop into both eyes as needed 10/24/24   Levi Freire MD   olmesartan-hydroCHLOROthiazide (BENICAR HCT) 20-12.5 MG per tablet Take 1 tablet by mouth daily 24   Dale Rios MD   NONFORMULARY Jigar eye drops- For allergies    Levi Freire MD   Multiple Vitamins-Minerals (MULTIVITAMIN ADULT EXTRA C PO) Take 1 tablet by mouth daily    Levi Freire MD   omalizumab 125 mg/mL in sterile water injection Inject into the skin Every 2 weeks    Levi Freire MD   Omega-3 1000 MG CAPS Take by mouth daily    Levi Freire MD   CPAP Machine MISC by Other route    Levi Freire MD   albuterol sulfate HFA (PROVENTIL;VENTOLIN;PROAIR) 108 (90 Base) MCG/ACT inhaler Inhale 2 puffs into the lungs every 4 hours as needed 10/21/14   Automatic Reconciliation, Ar   anastrozole (ARIMIDEX) 1 MG tablet Take by mouth every 7 days 22   Automatic Reconciliation, Ar   vitamin D3 (CHOLECALCIFEROL) 125 MCG (5000 UT) TABS tablet Take 1 tablet by mouth daily    Automatic Reconciliation, Ar   clomiPHENE (CLOMID) 50 MG tablet Take 53 mg by mouth three times a week    Automatic Reconciliation, Ar   fluticasone furoate-vilanterol (BREO ELLIPTA) 200-25 MCG/ACT AEPB inhaler Inhale 1 puff into the lungs daily    Automatic Reconciliation, Ar

## 2025-07-02 ENCOUNTER — ANESTHESIA (OUTPATIENT)
Facility: HOSPITAL | Age: 70
End: 2025-07-02
Payer: MEDICARE

## 2025-07-02 ENCOUNTER — HOSPITAL ENCOUNTER (OUTPATIENT)
Facility: HOSPITAL | Age: 70
Setting detail: OUTPATIENT SURGERY
Discharge: HOME | End: 2025-07-02
Attending: INTERNAL MEDICINE | Admitting: INTERNAL MEDICINE
Payer: MEDICARE

## 2025-07-02 VITALS
BODY MASS INDEX: 38.36 KG/M2 | RESPIRATION RATE: 22 BRPM | DIASTOLIC BLOOD PRESSURE: 65 MMHG | HEART RATE: 67 BPM | HEIGHT: 71 IN | SYSTOLIC BLOOD PRESSURE: 132 MMHG | TEMPERATURE: 97 F | OXYGEN SATURATION: 96 % | WEIGHT: 274 LBS

## 2025-07-02 PROCEDURE — 3600007502: Performed by: INTERNAL MEDICINE

## 2025-07-02 PROCEDURE — 6360000002 HC RX W HCPCS: Performed by: NURSE ANESTHETIST, CERTIFIED REGISTERED

## 2025-07-02 PROCEDURE — 7100000011 HC PHASE II RECOVERY - ADDTL 15 MIN: Performed by: INTERNAL MEDICINE

## 2025-07-02 PROCEDURE — 3700000000 HC ANESTHESIA ATTENDED CARE: Performed by: INTERNAL MEDICINE

## 2025-07-02 PROCEDURE — 2580000003 HC RX 258: Performed by: INTERNAL MEDICINE

## 2025-07-02 PROCEDURE — 3600007512: Performed by: INTERNAL MEDICINE

## 2025-07-02 PROCEDURE — 2709999900 HC NON-CHARGEABLE SUPPLY: Performed by: INTERNAL MEDICINE

## 2025-07-02 PROCEDURE — 7100000010 HC PHASE II RECOVERY - FIRST 15 MIN: Performed by: INTERNAL MEDICINE

## 2025-07-02 PROCEDURE — 3700000001 HC ADD 15 MINUTES (ANESTHESIA): Performed by: INTERNAL MEDICINE

## 2025-07-02 RX ORDER — PHENYLEPHRINE HCL IN 0.9% NACL 0.4MG/10ML
SYRINGE (ML) INTRAVENOUS
Status: DISCONTINUED | OUTPATIENT
Start: 2025-07-02 | End: 2025-07-02 | Stop reason: SDUPTHER

## 2025-07-02 RX ADMIN — PROPOFOL 90 MG: 10 INJECTION, EMULSION INTRAVENOUS at 13:13

## 2025-07-02 RX ADMIN — PROPOFOL 50 MG: 10 INJECTION, EMULSION INTRAVENOUS at 13:24

## 2025-07-02 RX ADMIN — Medication 120 MCG: at 13:31

## 2025-07-02 RX ADMIN — PROPOFOL 100 MG: 10 INJECTION, EMULSION INTRAVENOUS at 13:46

## 2025-07-02 RX ADMIN — PROPOFOL 100 MG: 10 INJECTION, EMULSION INTRAVENOUS at 13:37

## 2025-07-02 RX ADMIN — Medication 120 MCG: at 13:55

## 2025-07-02 RX ADMIN — PROPOFOL 100 MG: 10 INJECTION, EMULSION INTRAVENOUS at 13:50

## 2025-07-02 RX ADMIN — SODIUM CHLORIDE: 9 INJECTION, SOLUTION INTRAVENOUS at 12:41

## 2025-07-02 RX ADMIN — PROPOFOL 100 MG: 10 INJECTION, EMULSION INTRAVENOUS at 13:42

## 2025-07-02 RX ADMIN — PROPOFOL 50 MG: 10 INJECTION, EMULSION INTRAVENOUS at 13:18

## 2025-07-02 RX ADMIN — LIDOCAINE HYDROCHLORIDE 50 MG: 20 INJECTION, SOLUTION EPIDURAL; INFILTRATION; INTRACAUDAL; PERINEURAL at 13:13

## 2025-07-02 RX ADMIN — Medication 80 MCG: at 14:06

## 2025-07-02 RX ADMIN — PROPOFOL 10 MG: 10 INJECTION, EMULSION INTRAVENOUS at 13:32

## 2025-07-02 RX ADMIN — PROPOFOL 100 MG: 10 INJECTION, EMULSION INTRAVENOUS at 13:29

## 2025-07-02 ASSESSMENT — PAIN DESCRIPTION - DESCRIPTORS: DESCRIPTORS: ACHING

## 2025-07-02 ASSESSMENT — PAIN - FUNCTIONAL ASSESSMENT: PAIN_FUNCTIONAL_ASSESSMENT: 0-10

## 2025-07-02 NOTE — OP NOTE
Colonoscopy Procedure Note      Indications:  surveillance hx/o polyps     :  Bg Carrington MD    Staff: Circulator: Brianda Manley, OSWALDO; Joie Ross RN    Referring Provider: Dale Rios MD    Sedation:  MAC anesthesia Propofol    Procedure Details:  After informed consent was obtained with all risks and benefits of procedure explained and preoperative exam completed, the patient was taken to the endoscopy suite and placed in the left lateral decubitus position.  Upon sequential sedation as per above, a digital rectal exam was performed per below.  The Olympus videocolonoscope was inserted in the rectum and carefully advanced to the ascending colon.  The quality of preparation was adequate BPS 2/2/2 6.  The colonoscope was slowly withdrawn with careful evaluation between folds. Retroflexion in the rectum was performed.     Findings: Prolonged procedure [id number]. Extremely difficult procedure with extremely redundant colon requiring 4 assistants giving external pressure, moving him to his back position, as well as using a cold biopsy forceps in the working channel attempting to stiffen the scope even further, unable to reach cecum due due difficulty with anesthesia (he did well). Modifier 22 requested.  OFE: unremarkable  Rectum: normal  no mucosal lesion appreciated  Sigmoid: moderate diverticulosis, otherwise unremarkable  Descending Colon: moderate diverticulosis, otherwise unremarkable  Transverse Colon: moderate diverticulosis, otherwise unremarkable  Ascending Colon: brief views normal  Cecum: not intubated    Complications: None.     EBL:  none    Impression:    See Findings above    Recommendations:   - Resume normal medications.   Resume normal medications.  - Recommend repeat colonoscopy with 1 hour block and therapeutic endoscopist.  - discussed w/ Abi    Discharge Disposition:  Home in the company of a  when able to ambulate.    Bg Carrington,

## 2025-07-02 NOTE — PROGRESS NOTES
ARRIVAL INFORMATION:  Verified patient name and date of birth, scheduled procedure, and informed consent.     : Abi (wife) contact number: [phone]  Physician and staff can share information with the .     Receive texts: yes    Belongings with patient include:  Clothing,Glasses    GI FOCUSED ASSESSMENT:  Neuro: Awake, alert, oriented x4  Respiratory: even and unlabored   GI: soft and distended  EKG Rhythm: normal sinus rhythm    Education:Reviewed general discharge instructions and  information.

## 2025-07-02 NOTE — PROGRESS NOTES
Endoscopy Case End Note:    1404:  Procedure scope was pre-cleaned, per protocol, at bedside by Joie CHACON.      1408:  Report received from anesthesia - SCARLET Raza.  See anesthesia flowsheet for intra-procedure vital signs and events.

## 2025-07-02 NOTE — H&P
[facility]  Outpatient History and Physical    Patient: Gonzales Dangelo    Physician: Bg Carrington MD    Vital Signs: Blood pressure (!) 151/74, pulse 72, temperature 98 °F (36.7 °C), temperature source Temporal, resp. rate 16, height 1.803 m (5' 11\"), weight 124.3 kg (274 lb), SpO2 98%.    Allergies:   Allergies   Allergen Reactions    Iodine Hives     IVP contrast       Chief Complaint: hx/o polyps, incomplete colonoscopy due to severely redundant colon    History:  Past Medical History:   Diagnosis Date    Alcohol dependence (HCC)     Anesthesia complication     was bagged after 1st colonoscopy. pt was undiagnosed at that time with TIM, no problems since them    Asthma     X 27, seasonal     Colon polyp     ED (erectile dysfunction)     GERD (gastroesophageal reflux disease)     after meals only, diet controlled no meds    Hypertension     IBS (irritable bowel syndrome)     Nephrolithiasis     TIM on CPAP     severe    Positive PPD     exposure, no active    Right knee DJD     Seasonal allergic rhinitis     Ventral hernia 2012      Past Surgical History:   Procedure Laterality Date    COLONOSCOPY      due     COLONOSCOPY N/A 2024    COLONOSCOPY performed by Bg Carrington MD at [facility] ENDOSCOPY    HERNIA REPAIR  2016    ventral    JOINT REPLACEMENT Left 2018    PROSTATE BIOPSY, NEEDLE, SATURATION SAMPLING      prostate bx benign    PROSTHESIS, PENILE, INFLATAB      VASECTOMY        Social History     Socioeconomic History    Marital status:      Spouse name: None    Number of children: None    Years of education: None    Highest education level: None   Tobacco Use    Smoking status: Former     Types: Cigarettes     Start date:      Quit date:      Years since quittin.5    Smokeless tobacco: Never   Vaping Use    Vaping status: Never Used   Substance and Sexual Activity    Alcohol use: Not Currently     Comment: Rarely    Drug use:

## 2025-07-02 NOTE — ANESTHESIA POSTPROCEDURE EVALUATION
Department of Anesthesiology  Postprocedure Note    Patient: Gonzales Dangelo  MRN: [id number]  YOB: 1955  Date of evaluation: 7/2/2025    Procedure Summary       Date: 07/02/25 Room / Location: [facility] END[facility] 03 / [facility] ENDOSCOPY    Anesthesia Start: 1309 Anesthesia Stop: 1411    Procedure: COLONOSCOPY Diagnosis:       Personal history of colon polyps, unspecified      Diverticulosis      Internal hemorrhoids      (Personal history of colon polyps, unspecified [Z86.0100])    Surgeons: Bg Carrington MD Responsible Provider: Jerod Helton MD    Anesthesia Type: MAC, TIVA ASA Status: 3            Anesthesia Type: MAC, TIVA    Salbador Phase I: Salbador Score: 10    Salbador Phase II: Salbador Score: 10    Anesthesia Post Evaluation    Patient location during evaluation: PACU  Patient participation: complete - patient participated  Level of consciousness: sleepy but conscious and responsive to verbal stimuli  Airway patency: patent  Nausea & Vomiting: no vomiting and no nausea  Cardiovascular status: blood pressure returned to baseline and hemodynamically stable  Respiratory status: acceptable  Hydration status: stable    No notable events documented.

## 2025-07-02 NOTE — DISCHARGE INSTRUCTIONS
Gonzales Dangelo  [id number]  1955    It was my pleasure seeing you for your procedure.  You will also receive a summary report with the findings from this procedure and any further recommendations.  If you had polyps removed or biopsies taken during your procedure, you will receive a separate letter from me within the next 2 weeks.  If you don't receive this letter or if you have any questions, please call my office [phone].     Please take note of the post procedure instructions listed below.    Best Wishes,    Dr. Carrington      CARE FOLLOWING YOUR PROCEDURE    These instructions give you information on caring for yourself after your procedure. Call your doctor if you have any problems or questions after your procedure.    HOME CARE  Walk if you have belly cramping or gas.  Walking will help get rid of the air and reduce the bloated feeling in your belly (abdomen).  Your IV site (where you received drugs) may be tender to touch.  Place warm towels on the site; keep your arm up on two pillows if you have any swelling or soreness in the area.  You may shower.    ACTIVITY:  Take frequent rest periods and move at a slower pace for the next 24 hours..  You may resume your regular activity tomorrow if you are feeling back to normal.  Do not drive or ride a bicycle for at least 24 hours (because of the medicine (anesthesia) used during the test).  Do not sign any important legal documents or use or operate any machinery for 24 hours  Do not take sleeping medicines/nerve drugs for 24 hours unless the doctor tells you.  You can return to work/school tomorrow unless otherwise instructed.    NUTRITION:  Drink plenty of fluids to keep your pee (urine) clear or pale yellow  Begin with a light meal and progress to your normal diet. Heavy or fried foods are harder to digest and may make you feel sick to your stomach (nauseated).  Once you are feeling back to normal, you may resume your normal diet as

## (undated) DEVICE — TRAP ENDOSCP POLYP 2 CHMBR DRAWER TYP

## (undated) DEVICE — FORCEPS BX 240CM 2.4MM L NDL RAD JAW 4 M00513334

## (undated) DEVICE — IV START KIT: Brand: MEDLINE

## (undated) DEVICE — ELECTRODE PT RET AD L9FT HI MOIST COND ADH HYDRGEL CORDED

## (undated) DEVICE — SET GRAV CK VLV NEEDLESS ST 3 GANGED 4WAY STPCOCK HI FLO 10

## (undated) DEVICE — SNARE ENDOSCP AD L240CM LOOP W10MM SHTH DIA2.4MM RND INSUL

## (undated) DEVICE — ENDOSCOPIC KIT COMPLIANCE ENDOKIT

## (undated) DEVICE — CONTAINER SPEC 20 ML LID NEUT BUFF FORMALIN 10 % POLYPR STS

## (undated) DEVICE — CUFF BLD PRSS AD CLTH SGL TB W/ BAYNT CONN ROUNDED CORNER